# Patient Record
Sex: MALE | Race: WHITE | NOT HISPANIC OR LATINO | Employment: FULL TIME | URBAN - METROPOLITAN AREA
[De-identification: names, ages, dates, MRNs, and addresses within clinical notes are randomized per-mention and may not be internally consistent; named-entity substitution may affect disease eponyms.]

---

## 2023-08-10 LAB
LV EF: 67 %
LVEF MODALITY: NORMAL

## 2023-09-27 LAB
ALANINE AMINOTRANSFERASE (SGPT) (U/L) IN SER/PLAS: 27 U/L (ref 10–52)
ALBUMIN (G/DL) IN SER/PLAS: 4.2 G/DL (ref 3.4–5)
ALKALINE PHOSPHATASE (U/L) IN SER/PLAS: 76 U/L (ref 33–120)
ANION GAP IN SER/PLAS: 12 MMOL/L (ref 10–20)
ASPARTATE AMINOTRANSFERASE (SGOT) (U/L) IN SER/PLAS: 21 U/L (ref 9–39)
BILIRUBIN TOTAL (MG/DL) IN SER/PLAS: 0.5 MG/DL (ref 0–1.2)
CALCIUM (MG/DL) IN SER/PLAS: 9.6 MG/DL (ref 8.6–10.3)
CARBON DIOXIDE, TOTAL (MMOL/L) IN SER/PLAS: 29 MMOL/L (ref 21–32)
CHLORIDE (MMOL/L) IN SER/PLAS: 103 MMOL/L (ref 98–107)
CREATININE (MG/DL) IN SER/PLAS: 0.93 MG/DL (ref 0.5–1.3)
ERYTHROCYTE DISTRIBUTION WIDTH (RATIO) BY AUTOMATED COUNT: 13 % (ref 11.5–14.5)
ERYTHROCYTE MEAN CORPUSCULAR HEMOGLOBIN CONCENTRATION (G/DL) BY AUTOMATED: 32.9 G/DL (ref 32–36)
ERYTHROCYTE MEAN CORPUSCULAR VOLUME (FL) BY AUTOMATED COUNT: 92 FL (ref 80–100)
ERYTHROCYTE [DISTWIDTH] IN BLOOD BY AUTOMATED COUNT: 13 % (ref 11.5–14.5)
ERYTHROCYTES (10*6/UL) IN BLOOD BY AUTOMATED COUNT: 4.82 X10E12/L (ref 4.5–5.9)
GFR MALE: >90 ML/MIN/1.73M2
GLUCOSE (MG/DL) IN SER/PLAS: 47 MG/DL (ref 74–99)
HCT VFR BLD CALC: 44.4 % (ref 41–52)
HEMATOCRIT (%) IN BLOOD BY AUTOMATED COUNT: 44.4 % (ref 41–52)
HEMOGLOBIN (G/DL) IN BLOOD: 14.6 G/DL (ref 13.5–17.5)
HEMOGLOBIN: 14.6 G/DL (ref 13.5–17.5)
INR BLD: 1 (ref 0.9–1.1)
INR IN PPP BY COAGULATION ASSAY: 1 (ref 0.9–1.1)
LEUKOCYTES (10*3/UL) IN BLOOD BY AUTOMATED COUNT: 5.6 X10E9/L (ref 4.4–11.3)
MCHC RBC AUTO-ENTMCNC: 32.9 G/DL (ref 32–36)
MCV RBC AUTO: 92 FL (ref 80–100)
PLATELET # BLD: 247 X10E9/L (ref 150–450)
PLATELETS (10*3/UL) IN BLOOD AUTOMATED COUNT: 247 X10E9/L (ref 150–450)
POTASSIUM (MMOL/L) IN SER/PLAS: 4.4 MMOL/L (ref 3.5–5.3)
PROTEIN TOTAL: 7.1 G/DL (ref 6.4–8.2)
PROTHROMBIN TIME (PT) IN PPP BY COAGULATION ASSAY: 11.2 SEC (ref 9.8–12.8)
PROTHROMBIN TIME: 11.2 SEC (ref 9.8–12.8)
RBC # BLD: 4.82 X10E12/L (ref 4.5–5.9)
SODIUM (MMOL/L) IN SER/PLAS: 140 MMOL/L (ref 136–145)
THYROTROPIN (MIU/L) IN SER/PLAS BY DETECTION LIMIT <= 0.05 MIU/L: 1.51 MIU/L (ref 0.44–3.98)
THYROXINE (T4) FREE (NG/DL) IN SER/PLAS: 1.01 NG/DL (ref 0.61–1.12)
UREA NITROGEN (MG/DL) IN SER/PLAS: 15 MG/DL (ref 6–23)
WBC: 5.6 X10E9/L (ref 4.4–11.3)

## 2023-09-29 ENCOUNTER — HOSPITAL ENCOUNTER (OUTPATIENT)
Dept: DATA CONVERSION | Facility: HOSPITAL | Age: 53
End: 2023-09-29
Attending: INTERNAL MEDICINE | Admitting: INTERNAL MEDICINE
Payer: COMMERCIAL

## 2023-09-29 DIAGNOSIS — T82.7XXA INFECTION AND INFLAMMATORY REACTION DUE TO OTHER CARDIAC AND VASCULAR DEVICES, IMPLANTS AND GRAFTS, INITIAL ENCOUNTER (CMS-HCC): ICD-10-CM

## 2023-09-29 DIAGNOSIS — Z95.818 PRESENCE OF OTHER CARDIAC IMPLANTS AND GRAFTS: ICD-10-CM

## 2023-09-29 DIAGNOSIS — R55 SYNCOPE AND COLLAPSE: ICD-10-CM

## 2023-09-30 NOTE — H&P
History & Physical Reviewed:   I have reviewed the History and Physical dated:  27-Sep-2023   History and Physical reviewed and relevant findings noted. Patient examined to review pertinent physical  findings.: No significant changes   Home Medications Reviewed: no changes noted   Allergies Reviewed: no changes noted       Airway/Sedation Assessment:  ·  Mouth Opening OK yes   ·  Neck Flexibility OK yes   ·  Loose Teeth no   ·  Oropharyngeal Classification Class II       ERAS (Enhanced Recovery After Surgery):  ·  ERAS Patient: no     Consent:   COVID-19 Consent:  ·  COVID-19 Risk Consent Surgeon has reviewed key risks related to the risk of abbie COVID-19 and if they contract COVID-19 what the risks are.       Electronic Signatures:  Denzel Carpenter)  (Signed 29-Sep-2023 13:51)   Authored: History & Physical Reviewed, Airway/Sedation,  ERAS, Consent, Note Completion      Last Updated: 29-Sep-2023 13:51 by Denzel Carpenter)

## 2023-10-02 LAB
ATRIAL RATE: 60 BPM
P AXIS: 57 DEGREES
P OFFSET: 173 MS
P ONSET: 109 MS
PR INTERVAL: 206 MS
Q ONSET: 212 MS
QRS COUNT: 10 BEATS
QRS DURATION: 94 MS
QT INTERVAL: 390 MS
QTC CALCULATION(BAZETT): 390 MS
QTC FREDERICIA: 390 MS
R AXIS: 41 DEGREES
T AXIS: 48 DEGREES
T OFFSET: 407 MS
VENTRICULAR RATE: 60 BPM

## 2023-10-06 PROBLEM — E78.00 HYPERCHOLESTEROLEMIA: Status: ACTIVE | Noted: 2023-10-06

## 2023-10-06 PROBLEM — R30.0 DYSURIA: Status: ACTIVE | Noted: 2023-10-06

## 2023-10-06 PROBLEM — K21.9 GERD WITHOUT ESOPHAGITIS: Status: ACTIVE | Noted: 2023-10-06

## 2023-10-06 PROBLEM — B35.1 ONYCHOMYCOSIS OF TOENAIL: Status: ACTIVE | Noted: 2023-10-06

## 2023-10-06 PROBLEM — F12.91 HISTORY OF MARIJUANA USE: Status: ACTIVE | Noted: 2023-10-06

## 2023-10-06 PROBLEM — F41.1 GENERALIZED ANXIETY DISORDER: Status: ACTIVE | Noted: 2023-10-06

## 2023-10-06 PROBLEM — E03.9 HYPOTHYROIDISM: Status: ACTIVE | Noted: 2023-10-06

## 2023-10-06 PROBLEM — R94.31 ABNORMAL EKG: Status: ACTIVE | Noted: 2023-10-06

## 2023-10-06 PROBLEM — G47.00 INSOMNIA: Status: ACTIVE | Noted: 2023-10-06

## 2023-10-06 PROBLEM — B35.3 TINEA PEDIS OF BOTH FEET: Status: ACTIVE | Noted: 2023-10-06

## 2023-10-06 PROBLEM — R55 SYNCOPE: Status: ACTIVE | Noted: 2023-10-06

## 2023-10-06 PROBLEM — E66.3 OVERWEIGHT WITH BODY MASS INDEX (BMI) OF 27 TO 27.9 IN ADULT: Status: ACTIVE | Noted: 2023-10-06

## 2023-10-06 RX ORDER — NICOTINE 7MG/24HR
1 PATCH, TRANSDERMAL 24 HOURS TRANSDERMAL
COMMUNITY
Start: 2022-07-15 | End: 2023-10-11 | Stop reason: ALTCHOICE

## 2023-10-06 RX ORDER — ATORVASTATIN CALCIUM 10 MG/1
1 TABLET, FILM COATED ORAL DAILY
COMMUNITY
Start: 2018-09-05 | End: 2024-02-19 | Stop reason: SDUPTHER

## 2023-10-06 RX ORDER — LEVOTHYROXINE SODIUM 100 UG/1
1 TABLET ORAL DAILY
COMMUNITY
Start: 2019-10-18 | End: 2024-02-19 | Stop reason: SDUPTHER

## 2023-10-06 RX ORDER — FAMOTIDINE 40 MG/1
TABLET, FILM COATED ORAL
COMMUNITY
Start: 2019-12-03 | End: 2024-02-19 | Stop reason: SDUPTHER

## 2023-10-10 ENCOUNTER — TELEPHONE (OUTPATIENT)
Dept: CARDIOLOGY | Facility: CLINIC | Age: 53
End: 2023-10-10
Payer: COMMERCIAL

## 2023-10-10 NOTE — TELEPHONE ENCOUNTER
FMLA form received for patient.   Seen inpatient 8/5/23-8/7/23 for syncopal episode and received Loop Implant.   In follow up with Dr. Jayden MD- loop pocket was infected, and patient underwent Loop Removal on 9/29/23.   Forms completed to reflect above. Given to Joellen LANE RN to take to Dr. Jayden MD tomorrow when patient is here for follow up.

## 2023-10-11 ENCOUNTER — OFFICE VISIT (OUTPATIENT)
Dept: CARDIOLOGY | Facility: CLINIC | Age: 53
End: 2023-10-11
Payer: COMMERCIAL

## 2023-10-11 VITALS
WEIGHT: 192 LBS | HEART RATE: 64 BPM | HEIGHT: 70 IN | BODY MASS INDEX: 27.49 KG/M2 | SYSTOLIC BLOOD PRESSURE: 104 MMHG | DIASTOLIC BLOOD PRESSURE: 67 MMHG

## 2023-10-11 DIAGNOSIS — R94.31 ABNORMAL EKG: ICD-10-CM

## 2023-10-11 DIAGNOSIS — R55 SYNCOPE, UNSPECIFIED SYNCOPE TYPE: Primary | ICD-10-CM

## 2023-10-11 PROCEDURE — 93000 ELECTROCARDIOGRAM COMPLETE: CPT | Performed by: INTERNAL MEDICINE

## 2023-10-11 PROCEDURE — 99214 OFFICE O/P EST MOD 30 MIN: CPT | Performed by: INTERNAL MEDICINE

## 2023-10-11 NOTE — PATIENT INSTRUCTIONS
Patient to follow up in 2 months  Patient may go back to work from a cardiac standpoint with no restrictions

## 2023-10-11 NOTE — TELEPHONE ENCOUNTER
Patient was seen in the office today with DR. Denzel Carpenter M.D.  Cleared to go back to work with no restrictions from a cardiac standpoint.  DR. Denzel Carpenter M.D. signed [facility] paperwork and we will fax to [employer].

## 2023-10-11 NOTE — PROGRESS NOTES
CARDIOLOGY OFFICE VISIT      CHIEF COMPLAINT  Chief Complaint   Patient presents with    Hospital Follow-up     Patient presented today for hospital follow up.       HISTORY OF PRESENT ILLNESS  HPI    53-year-old male with a past medical history of hypothyroidism and hyperlipidemia. History of smoking and marijuana use. Patient was admitted and was 2023 after having a syncopal episode. Apparently his first episode of syncope was 4 years ago. He was diagnosed with possible seizures.    In August 2023, patient woke up in the morning he was brushing his teeth and suddenly his fiancée heard a loud thump and went on to check on him and the patient was laying on the ground. she is started CPR for approximately 1 to 2 minutes and the patient regained consciousness. In the emergency department, initial EKG was sinus bradycardia. Telemetry shows sinus rhythm. TSH 0.63. Echocardiogram in August 2023 shows left ventricular shunt fraction of 60% with trace mitral and tricuspid regurgitation. His stress test in August 2023 was negative for ischemia myocardial infarction with a left ventricular ejection fraction of 67%. A loop recorder was implanted.    During the last office visit, there was evidence of infection of the loop recorder pocket.    Patient underwent loop recorder explantation September 29, 2023 with no complications.  He received antibiotic therapy for 7 days.    Overall he is doing well.  Denies any fever or chills.  No dizziness or syncope.    EKG performed today shows sinus rhythm rate of 64 bpm QRS duration 92 ms QT corrected 400 ms.  Rhythm strip shows the same pattern.        Past Medical History  Past Medical History:   Diagnosis Date    Encounter for general adult medical examination without abnormal findings     Health care maintenance    Other specified abnormal findings of blood chemistry     High serum cholestanol    Personal history of other diseases of the digestive system 12/03/2019    History of  gastroesophageal reflux (GERD)    Personal history of other diseases of the respiratory system     History of pharyngitis    Personal history of other specified conditions     History of abdominal pain    Personal history of other specified conditions 10/18/2016    History of diarrhea    Personal history of other specified conditions     History of dysphagia    Personal history of other specified conditions     History of syncope       Social History  Social History     Tobacco Use    Smoking status: Never    Smokeless tobacco: Current   Substance Use Topics    Alcohol use: Not Currently    Drug use: Yes     Types: Marijuana       Family History     Family History   Problem Relation Name Age of Onset    Hypertension Mother      Coronary artery disease Father      Alzheimer's disease Father      Dementia Father      Diabetes Other grandparent         Allergies:  No Known Allergies     Outpatient Medications:  Current Outpatient Medications   Medication Instructions    atorvastatin (Lipitor) 10 mg tablet 1 tablet, oral, Daily    famotidine (Pepcid) 40 mg tablet 1 tab(s) orally once a day (at bedtime)    levothyroxine (Synthroid, Levoxyl) 100 mcg tablet 1 tablet, oral, Daily          REVIEW OF SYSTEMS  ROS      VITALS  Vitals:    10/11/23 0818   BP: 104/67   Pulse: 64       PHYSICAL EXAM  Physical Exam  PECOMP    PHYSICAL EXAMINATION:  GENERAL APPEARANCE: Well developed, well nourished, in no acute distress.  CHEST: Symmetric and non-tender.  INTEGUMENT: Skin warm and dry, without gross excoriationis or lesions.  HEENT: No gross abnormalities of conjunctiva, teeth, gums, oral mucosa  NECK: Supple, no JVD, no bruit. Thyroid not palpable. Carotid upstrokes normal.  NEURO/PSHCY: Alert and oriented x3; appropriate behavior and responses and responses, grossly normal cerebellar function with normal balance and coordination  LUNGS: Clear to auscultation bilaterally; normal respiratory effort.  HEART: Rate and rhythm regular  with no evident murmur; no gallop appreciated. There are no rubs, clicks or heaves. PMI nondisplaced.  ABDOMEN: Soft, nontender, no palpable hepatosplenomegaly, no mases, no bruits. Abdominal aorta not noted to be enlarged.  MUSCULOSKELETAL: Ambulatory with normal tandem gait.  EXTREMITIES: Warm with good color, no clubbing or cyanois. There is no edema noted.  PERIPHERAL VASCULAR: Pulses present and equally palpable; 2+ throughout. No femoral bruits.      ASSESSMENT AND PLAN    Clinical impression    1. Syncope, recurrence  2. Status post loop recorder implanted in August 2023 with no complications  3. Normal left ventricular function per echocardiogram in 2023  4. No evidence of ischemia per stress test in 2023  5. History of hyperlipidemia  6. Loop recorder pocket infection    Plan-recommendations    I had a lengthy discussion with patient regarding management of syncope and loop recorder implantation with removal due to infection.  Incision with the device pocket healing well.  Stitches removed during this office visit.    Long conversation with patient regarding plan to follow.  Patient would like to be in observation for now.  He believes that his syncopal episode was due to dehydration.  He understood that in case there is any evidence of atrial or ventricular arrhythmias we may not be able to capture those if there is no monitor available at that time.  Patient agrees with that.    We will continue with observation for now.    If patient has recurrence of syncope, he agrees that a loop recorder will be recommended.    Follow my office in 2 months or sooner if needed.    Risk factor modification and lifestyle modification discussed with patient. Diet , exercise and hydration discussed with patient.    I have personally review with patient during this office visit, laboratory data, echocardiogram results, stress test results, Holter-event monitor results prior and after the last electrophysiology visit. All  questions has been answered.    Please excuse any errors in grammar or translation related to this dictation.  Voice recognition software was utilized to prepare this document.

## 2023-10-12 ENCOUNTER — OFFICE VISIT (OUTPATIENT)
Dept: PRIMARY CARE | Facility: CLINIC | Age: 53
End: 2023-10-12
Payer: COMMERCIAL

## 2023-10-12 VITALS
WEIGHT: 192 LBS | DIASTOLIC BLOOD PRESSURE: 80 MMHG | TEMPERATURE: 98 F | SYSTOLIC BLOOD PRESSURE: 122 MMHG | HEIGHT: 70 IN | BODY MASS INDEX: 27.49 KG/M2 | HEART RATE: 72 BPM

## 2023-10-12 DIAGNOSIS — R55 SYNCOPE, UNSPECIFIED SYNCOPE TYPE: Primary | ICD-10-CM

## 2023-10-12 PROCEDURE — 1036F TOBACCO NON-USER: CPT | Performed by: STUDENT IN AN ORGANIZED HEALTH CARE EDUCATION/TRAINING PROGRAM

## 2023-10-12 PROCEDURE — 99213 OFFICE O/P EST LOW 20 MIN: CPT | Performed by: STUDENT IN AN ORGANIZED HEALTH CARE EDUCATION/TRAINING PROGRAM

## 2023-10-12 ASSESSMENT — ENCOUNTER SYMPTOMS
LOSS OF SENSATION IN FEET: 0
DEPRESSION: 0
OCCASIONAL FEELINGS OF UNSTEADINESS: 0

## 2023-10-12 ASSESSMENT — PATIENT HEALTH QUESTIONNAIRE - PHQ9
2. FEELING DOWN, DEPRESSED OR HOPELESS: NOT AT ALL
1. LITTLE INTEREST OR PLEASURE IN DOING THINGS: NOT AT ALL
SUM OF ALL RESPONSES TO PHQ9 QUESTIONS 1 AND 2: 0

## 2023-10-12 NOTE — PROGRESS NOTES
"Subjective   Patient ID: Erick Jean is a 53 y.o. male who presents for Return to Work (Patient is here in office today to get clearance to return to work. Patient states that neuro  (dr. Alvarez) and EP (Dr. Carpenter) gave clearance ).    Saw Dr. Alvarez just before this appointment, and unfortunately did not ask for work clearance  He is going to follow-up in a month  Neuro was concerned for thenar wasting  Patient does not have symptoms of carpal tunnel  Also saw Dr. Carpenter yesterday and he states he is OK with going back to work from a cardiac standpoint  Feels well, no pre-syncope, and no syncopal events since August  Cardiology feels it is possible, as the patient states, that it may have been due to dehydration and is going to watch this carefully as he returns to work    Plan:  Work note to return to work without restrictions  Follow-up with cardiology in 2 months  Follow-up with neurology in 1 month  Carefully monitor for any symptoms of pre-syncope and immediately sit down and rehydrate if any of these occur, and if they persist, call 911  Follow-up in 4 months    HPI     Review of Systems    Objective   /80 (BP Location: Left arm, Patient Position: Sitting, BP Cuff Size: Adult)   Pulse 72   Temp 36.7 °C (98 °F) (Temporal)   Ht 1.778 m (5' 10\")   Wt 87.1 kg (192 lb)   BMI 27.55 kg/m²     Physical Exam  Constitutional:       General: He is not in acute distress.     Appearance: Normal appearance. He is not ill-appearing or toxic-appearing.   HENT:      Head: Atraumatic.      Mouth/Throat:      Mouth: Mucous membranes are moist.      Pharynx: Oropharynx is clear.   Eyes:      Extraocular Movements: Extraocular movements intact.      Conjunctiva/sclera: Conjunctivae normal.      Pupils: Pupils are equal, round, and reactive to light.   Cardiovascular:      Rate and Rhythm: Normal rate and regular rhythm.      Pulses: Normal pulses.      Heart sounds: Normal heart sounds. No murmur heard.  Pulmonary:    "   Effort: Pulmonary effort is normal. No respiratory distress.      Breath sounds: Normal breath sounds.   Abdominal:      General: Abdomen is flat.      Palpations: Abdomen is soft.      Tenderness: There is no abdominal tenderness.   Musculoskeletal:      Right lower leg: No edema.      Left lower leg: No edema.      Comments: Negative tinels and phalens bilaterally  Full strength and sensory througout  No reproducible symptoms of CTS   Skin:     General: Skin is warm and dry.      Capillary Refill: Capillary refill takes less than 2 seconds.      Findings: No rash.   Neurological:      General: No focal deficit present.      Mental Status: He is alert.      Cranial Nerves: No cranial nerve deficit.      Gait: Gait normal.   Psychiatric:         Mood and Affect: Mood normal.         Behavior: Behavior normal.         Assessment/Plan   Problem List Items Addressed This Visit             ICD-10-CM    Syncope - Primary R55

## 2023-10-12 NOTE — LETTER
October 12, 2023     Patient: Erick Jean   YOB: 1970   Date of Visit: 10/12/2023       To Whom It May Concern:    It is my medical opinion that Erick Jean may return to full duty immediately with no restrictions.  Erick has been evaluated by myself, cardiology, and neurology, and it is thought at this time that there is no significant risk for Erick to return to work.      If you have any questions or concerns, please don't hesitate to call.         Sincerely,        Alin Daly DO    CC: No Recipients

## 2023-10-13 ENCOUNTER — TELEPHONE (OUTPATIENT)
Dept: CARDIOLOGY | Facility: CLINIC | Age: 53
End: 2023-10-13
Payer: COMMERCIAL

## 2023-10-13 NOTE — TELEPHONE ENCOUNTER
Patient called for a letter to return to work. Per DR. Denzel Carpenter M.D. he can return with no restrictions. This was sent to his company per his request.

## 2023-10-13 NOTE — LETTER
October 13, 2023     Erick Jean  [address] [place] [address]    Patient: Erick Jean   YOB: 1970   Date of Visit: 10/13/2023       To Whom It May Concern:      Erick Jean was seen in my office on Wednesday, October 11, 2023.  He may return to work with no restrictions on October 12, 2023.     Sincerely,     Dr. Denzel Carpenter  ______________________________________________________________________________________

## 2023-10-16 ENCOUNTER — TELEPHONE (OUTPATIENT)
Dept: CARDIOLOGY | Facility: CLINIC | Age: 53
End: 2023-10-16
Payer: COMMERCIAL

## 2023-10-16 NOTE — LETTER
Date: 2023  RE:  Erick Jean  :  1970      To Whom It May Concern:    Our patient, Erick, has been under our care and now may return back to work without restrictions.    Their return to work date is:  10/20/2023    If you have questions concerning this patient's immediate care, please feel free to contact our office at [phone].    Sincerely,      Dr Denzel Carpenter

## 2023-10-16 NOTE — TELEPHONE ENCOUNTER
Pt left message asking for return call.    Pt's employee health nurse also left message stating that they are still in need of RTW note for sonja Vera can be reached at [phone].    Please follow up with pt and Chuy.

## 2023-10-20 NOTE — TELEPHONE ENCOUNTER
Telephone message received from the patient's employer requesting a return to work slip.  Medical records were reviewed and confirmed with Dr. Carpenter the patient has no work restrictions.  He may return to work as scheduled.  Note tasked to JENNY Swain for patient follow-up.

## 2023-10-20 NOTE — TELEPHONE ENCOUNTER
Per message from Dr Carpentre, patient may return to work with no restrictions. Called Chuy and left a detailed message requesting fax number so that note can be faxed to her.

## 2023-12-20 ENCOUNTER — OFFICE VISIT (OUTPATIENT)
Dept: CARDIOLOGY | Facility: CLINIC | Age: 53
End: 2023-12-20
Payer: COMMERCIAL

## 2023-12-20 VITALS
HEIGHT: 70 IN | DIASTOLIC BLOOD PRESSURE: 70 MMHG | WEIGHT: 195 LBS | SYSTOLIC BLOOD PRESSURE: 102 MMHG | HEART RATE: 65 BPM | BODY MASS INDEX: 27.92 KG/M2

## 2023-12-20 DIAGNOSIS — Z78.9 NEVER SMOKED CIGARETTES: ICD-10-CM

## 2023-12-20 DIAGNOSIS — E66.3 OVERWEIGHT WITH BODY MASS INDEX (BMI) OF 27 TO 27.9 IN ADULT: ICD-10-CM

## 2023-12-20 DIAGNOSIS — R55 SYNCOPE, UNSPECIFIED SYNCOPE TYPE: Primary | ICD-10-CM

## 2023-12-20 PROCEDURE — 3008F BODY MASS INDEX DOCD: CPT | Performed by: INTERNAL MEDICINE

## 2023-12-20 PROCEDURE — 93000 ELECTROCARDIOGRAM COMPLETE: CPT | Performed by: INTERNAL MEDICINE

## 2023-12-20 PROCEDURE — 99214 OFFICE O/P EST MOD 30 MIN: CPT | Performed by: INTERNAL MEDICINE

## 2023-12-20 PROCEDURE — 1036F TOBACCO NON-USER: CPT | Performed by: INTERNAL MEDICINE

## 2023-12-20 NOTE — PROGRESS NOTES
CARDIOLOGY OFFICE VISIT      CHIEF COMPLAINT  Chief Complaint   Patient presents with    Follow-up     Patient presented today for a 2 month follow up.         HISTORY OF PRESENT ILLNESS  HPI    53-year-old male with a past medical history of hypothyroidism and hyperlipidemia. History of smoking and marijuana use. Patient was admitted and was 2023 after having a syncopal episode. Apparently his first episode of syncope was 4 years ago. He was diagnosed with possible seizures.     In August 2023, patient woke up in the morning he was brushing his teeth and suddenly his fiancée heard a loud thump and went on to check on him and the patient was laying on the ground. she is started CPR for approximately 1 to 2 minutes and the patient regained consciousness. In the emergency department, initial EKG was sinus bradycardia. Telemetry shows sinus rhythm. TSH 0.63. Echocardiogram in August 2023 shows left ventricular shunt fraction of 60% with trace mitral and tricuspid regurgitation. His stress test in August 2023 was negative for ischemia myocardial infarction with a left ventricular ejection fraction of 67%. A loop recorder was implanted.     there was evidence of infection of the loop recorder pocket.     Patient underwent loop recorder explantation September 29, 2023 with no complications. He received antibiotic therapy for 7 days.    Since the last office visit, he has been doing well.  He denies any symptoms of chest pain or shortness of breath or palpitations.    EKG performed today shows sinus rhythm at rate of 65 bpm QRS ration 90 ms QT corrected 407 ms.  Rhythm strip shows the same pattern.        Past Medical History  Past Medical History:   Diagnosis Date    Encounter for general adult medical examination without abnormal findings     Health care maintenance    Other specified abnormal findings of blood chemistry     High serum cholestanol    Personal history of other diseases of the digestive system 12/03/2019     History of gastroesophageal reflux (GERD)    Personal history of other diseases of the respiratory system     History of pharyngitis    Personal history of other specified conditions     History of abdominal pain    Personal history of other specified conditions 10/18/2016    History of diarrhea    Personal history of other specified conditions     History of dysphagia    Personal history of other specified conditions     History of syncope       Social History  Social History     Tobacco Use    Smoking status: Never    Smokeless tobacco: Never   Vaping Use    Vaping Use: Every day    Substances: Nicotine    Devices: Disposable   Substance Use Topics    Alcohol use: Yes     Comment: occasionally    Drug use: Not Currently       Family History     Family History   Problem Relation Name Age of Onset    Hypertension Mother      Coronary artery disease Father      Alzheimer's disease Father      Dementia Father      Diabetes Other grandparent         Allergies:  No Known Allergies     Outpatient Medications:  Current Outpatient Medications   Medication Instructions    atorvastatin (Lipitor) 10 mg tablet 1 tablet, oral, Daily    famotidine (Pepcid) 40 mg tablet 1 tab(s) orally once a day (at bedtime)    levothyroxine (Synthroid, Levoxyl) 100 mcg tablet 1 tablet, oral, Daily          REVIEW OF SYSTEMS  Review of Systems   All other systems reviewed and are negative.        VITALS  Vitals:    12/20/23 0754   BP: 102/70   Pulse: 65       PHYSICAL EXAM  Constitutional:       General: Awake.      Appearance: Normal and healthy appearance. Well-developed and not in distress.   Neck:      Vascular: No JVR. JVD normal.   Pulmonary:      Effort: Pulmonary effort is normal.      Breath sounds: Normal breath sounds. No wheezing. No rhonchi. No rales.   Chest:      Chest wall: Not tender to palpatation.   Cardiovascular:      PMI at left midclavicular line. Normal rate. Regular rhythm. Normal S1. Normal S2.       Murmurs: There  is no murmur.      No gallop.  No click. No rub.   Pulses:     Intact distal pulses.   Edema:     Peripheral edema absent.   Abdominal:      Tenderness: There is no abdominal tenderness.   Musculoskeletal: Normal range of motion.         General: No tenderness. Skin:     General: Skin is warm and dry.   Neurological:      General: No focal deficit present.      Mental Status: Alert and oriented to person, place and time.           ASSESSMENT AND PLAN  Clinical impression     1. Syncope, recurrence  2. Status post loop recorder implanted in August 2023 with no complications  3. Normal left ventricular function per echocardiogram in 2023  4. No evidence of ischemia per stress test in 2023  5. History of hyperlipidemia  6. Loop recorder pocket infection, status post loop removal in September 2023.  No reimplantation so far    Plan recommendations    Patient is doing well from the electrophysiologist on point.  No recurrence of syncope.  He will be followed my office every 6 months or sooner needed.    If he has recurrence of syncope, we will order a Holter or event monitor or implanted a new loop recorder.    Risk factor modification and lifestyle modification discussed with patient. Diet , exercise and hydration discussed with patient.    I have personally review with patient during this office visit, laboratory data, echocardiogram results, stress test results, Holter-event monitor results prior and after the last electrophysiology visit. All questions has been answered.    Please excuse any errors in grammar or translation related to this dictation.  Voice recognition software was utilized to prepare this document.

## 2023-12-20 NOTE — PATIENT INSTRUCTIONS
Continue same medications/treatment.  Patient educated on proper medication use.  Patient educated on risk factor modification.  Please bring any lab results from other providers/physicians to your next appointment.    Please bring all medicines, vitamins, and herbal supplements with you when you come to the office.    Prescriptions will not be filled unless you are compliant with your follow up appointments or have a follow up appointment scheduled as per instruction of your physician. Refills should be requested at the time of your visit.    Follow up with Alejandra in 6 months     MADELEINE ASHBY RN, AM SCRIBING FOR, AND IN THE PRESENCE OF DR. EVELIA HERBERT MD

## 2024-02-12 ENCOUNTER — OFFICE VISIT (OUTPATIENT)
Dept: PRIMARY CARE | Facility: CLINIC | Age: 54
End: 2024-02-12
Payer: COMMERCIAL

## 2024-02-12 VITALS
SYSTOLIC BLOOD PRESSURE: 116 MMHG | DIASTOLIC BLOOD PRESSURE: 82 MMHG | TEMPERATURE: 98.3 F | HEART RATE: 76 BPM | BODY MASS INDEX: 28.2 KG/M2 | HEIGHT: 70 IN | WEIGHT: 197 LBS

## 2024-02-12 DIAGNOSIS — R55 SYNCOPE, UNSPECIFIED SYNCOPE TYPE: Primary | ICD-10-CM

## 2024-02-12 PROCEDURE — 99213 OFFICE O/P EST LOW 20 MIN: CPT | Performed by: STUDENT IN AN ORGANIZED HEALTH CARE EDUCATION/TRAINING PROGRAM

## 2024-02-12 PROCEDURE — 1036F TOBACCO NON-USER: CPT | Performed by: STUDENT IN AN ORGANIZED HEALTH CARE EDUCATION/TRAINING PROGRAM

## 2024-02-12 PROCEDURE — 3008F BODY MASS INDEX DOCD: CPT | Performed by: STUDENT IN AN ORGANIZED HEALTH CARE EDUCATION/TRAINING PROGRAM

## 2024-02-12 ASSESSMENT — PATIENT HEALTH QUESTIONNAIRE - PHQ9
1. LITTLE INTEREST OR PLEASURE IN DOING THINGS: NOT AT ALL
2. FEELING DOWN, DEPRESSED OR HOPELESS: NOT AT ALL
SUM OF ALL RESPONSES TO PHQ9 QUESTIONS 1 AND 2: 0

## 2024-02-12 NOTE — PROGRESS NOTES
"Subjective   Patient ID: Erick Jean is a 53 y.o. male who presents for 2 month follow up (Patient is here in office today for 2 month follow. Pt has not had an reoccurrence of syncope, he did follow up with EP in 12/2023 who advised to follow up in 6 months,but sooner if he has any issues.).    Follow up on syncope  Has been doing well  Reviewed labs and specialist notes    Plan  Follow up with cardiology in 6 months  Return to office with any symptoms or concerns  Go to ED if pass out again or severe symptoms  Follow up as needed    10/12/23- Patient ID: Erick Jaen is a 53 y.o. male who presents for Return to Work (Patient is here in office today to get clearance to return to work. Patient states that neuro  (dr. Alvarez) and EP (Dr. Carpenter) gave clearance ).     Saw Dr. Alvarez just before this appointment, and unfortunately did not ask for work clearance  He is going to follow-up in a month  Neuro was concerned for thenar wasting  Patient does not have symptoms of carpal tunnel  Also saw Dr. Carpenter yesterday and he states he is OK with going back to work from a cardiac standpoint  Feels well, no pre-syncope, and no syncopal events since August  Cardiology feels it is possible, as the patient states, that it may have been due to dehydration and is going to watch this carefully as he returns to work     Plan:  Work note to return to work without restrictions  Follow-up with cardiology in 2 months  Follow-up with neurology in 1 month  Carefully monitor for any symptoms of pre-syncope and immediately sit down and rehydrate if any of these occur, and if they persist, call 911  Follow-up in 4 months    HPI     Review of Systems    Objective   /82 (BP Location: Left arm, Patient Position: Sitting, BP Cuff Size: Adult)   Pulse 76   Temp 36.8 °C (98.3 °F) (Temporal)   Ht 1.778 m (5' 10\")   Wt 89.4 kg (197 lb)   BMI 28.27 kg/m²     Physical Exam    Assessment/Plan   Problem List Items Addressed This Visit  "            ICD-10-CM    Syncope - Primary R55

## 2024-02-19 DIAGNOSIS — K21.9 GERD WITHOUT ESOPHAGITIS: ICD-10-CM

## 2024-02-19 DIAGNOSIS — E78.00 HYPERCHOLESTEROLEMIA: Primary | ICD-10-CM

## 2024-02-19 DIAGNOSIS — E03.9 HYPOTHYROIDISM, UNSPECIFIED TYPE: ICD-10-CM

## 2024-02-21 RX ORDER — LEVOTHYROXINE SODIUM 100 UG/1
100 TABLET ORAL DAILY
Qty: 90 TABLET | Refills: 1 | Status: SHIPPED | OUTPATIENT
Start: 2024-02-21

## 2024-02-21 RX ORDER — ATORVASTATIN CALCIUM 10 MG/1
10 TABLET, FILM COATED ORAL DAILY
Qty: 90 TABLET | Refills: 1 | Status: SHIPPED | OUTPATIENT
Start: 2024-02-21

## 2024-02-21 RX ORDER — FAMOTIDINE 40 MG/1
TABLET, FILM COATED ORAL
Qty: 90 TABLET | Refills: 1 | Status: SHIPPED | OUTPATIENT
Start: 2024-02-21

## 2024-06-20 ENCOUNTER — APPOINTMENT (OUTPATIENT)
Dept: CARDIOLOGY | Facility: CLINIC | Age: 54
End: 2024-06-20
Payer: COMMERCIAL

## 2024-07-09 ENCOUNTER — APPOINTMENT (OUTPATIENT)
Dept: PRIMARY CARE | Facility: CLINIC | Age: 54
End: 2024-07-09
Payer: COMMERCIAL

## 2024-07-09 VITALS
BODY MASS INDEX: 27.26 KG/M2 | TEMPERATURE: 98.3 F | HEART RATE: 68 BPM | HEIGHT: 70 IN | OXYGEN SATURATION: 97 % | WEIGHT: 190.4 LBS | DIASTOLIC BLOOD PRESSURE: 74 MMHG | SYSTOLIC BLOOD PRESSURE: 108 MMHG

## 2024-07-09 DIAGNOSIS — E58 INADEQUATE INTAKE OF CALCIUM AND VITAMIN D: ICD-10-CM

## 2024-07-09 DIAGNOSIS — Z12.5 SCREENING FOR PROSTATE CANCER: ICD-10-CM

## 2024-07-09 DIAGNOSIS — R55 SYNCOPE, UNSPECIFIED SYNCOPE TYPE: ICD-10-CM

## 2024-07-09 DIAGNOSIS — E03.9 ACQUIRED HYPOTHYROIDISM: Primary | ICD-10-CM

## 2024-07-09 DIAGNOSIS — Z13.89 SCREENING FOR BLOOD OR PROTEIN IN URINE: ICD-10-CM

## 2024-07-09 DIAGNOSIS — E78.00 HYPERCHOLESTEROLEMIA: ICD-10-CM

## 2024-07-09 DIAGNOSIS — E55.9 INADEQUATE INTAKE OF CALCIUM AND VITAMIN D: ICD-10-CM

## 2024-07-09 PROBLEM — G47.00 INSOMNIA: Status: RESOLVED | Noted: 2023-10-06 | Resolved: 2024-07-09

## 2024-07-09 PROBLEM — R30.0 DYSURIA: Status: RESOLVED | Noted: 2023-10-06 | Resolved: 2024-07-09

## 2024-07-09 PROBLEM — R94.31 ABNORMAL EKG: Status: RESOLVED | Noted: 2023-10-06 | Resolved: 2024-07-09

## 2024-07-09 PROBLEM — B35.3 TINEA PEDIS OF BOTH FEET: Status: RESOLVED | Noted: 2023-10-06 | Resolved: 2024-07-09

## 2024-07-09 PROBLEM — F41.1 GENERALIZED ANXIETY DISORDER: Status: RESOLVED | Noted: 2023-10-06 | Resolved: 2024-07-09

## 2024-07-09 PROCEDURE — 3008F BODY MASS INDEX DOCD: CPT | Performed by: INTERNAL MEDICINE

## 2024-07-09 PROCEDURE — 99214 OFFICE O/P EST MOD 30 MIN: CPT | Performed by: INTERNAL MEDICINE

## 2024-07-09 ASSESSMENT — ENCOUNTER SYMPTOMS: DEPRESSION: 0

## 2024-07-09 ASSESSMENT — PATIENT HEALTH QUESTIONNAIRE - PHQ9
2. FEELING DOWN, DEPRESSED OR HOPELESS: NOT AT ALL
SUM OF ALL RESPONSES TO PHQ9 QUESTIONS 1 AND 2: 0
1. LITTLE INTEREST OR PLEASURE IN DOING THINGS: NOT AT ALL

## 2024-07-15 DIAGNOSIS — E03.9 HYPOTHYROIDISM, UNSPECIFIED TYPE: ICD-10-CM

## 2024-07-15 DIAGNOSIS — K21.9 GERD WITHOUT ESOPHAGITIS: ICD-10-CM

## 2024-07-15 DIAGNOSIS — E78.00 HYPERCHOLESTEROLEMIA: ICD-10-CM

## 2024-07-17 RX ORDER — ATORVASTATIN CALCIUM 10 MG/1
10 TABLET, FILM COATED ORAL DAILY
Qty: 90 TABLET | Refills: 1 | Status: SHIPPED | OUTPATIENT
Start: 2024-07-17

## 2024-07-17 RX ORDER — LEVOTHYROXINE SODIUM 100 UG/1
100 TABLET ORAL DAILY
Qty: 90 TABLET | Refills: 1 | Status: SHIPPED | OUTPATIENT
Start: 2024-07-17

## 2024-07-17 RX ORDER — FAMOTIDINE 40 MG/1
TABLET, FILM COATED ORAL
Qty: 90 TABLET | Refills: 1 | Status: SHIPPED | OUTPATIENT
Start: 2024-07-17

## 2024-07-22 ENCOUNTER — LAB (OUTPATIENT)
Dept: LAB | Facility: LAB | Age: 54
End: 2024-07-22
Payer: COMMERCIAL

## 2024-07-22 ENCOUNTER — APPOINTMENT (OUTPATIENT)
Dept: CARDIOLOGY | Facility: CLINIC | Age: 54
End: 2024-07-22
Payer: COMMERCIAL

## 2024-07-22 VITALS
SYSTOLIC BLOOD PRESSURE: 120 MMHG | DIASTOLIC BLOOD PRESSURE: 62 MMHG | HEART RATE: 71 BPM | HEIGHT: 70 IN | WEIGHT: 192 LBS | BODY MASS INDEX: 27.49 KG/M2

## 2024-07-22 DIAGNOSIS — E55.9 INADEQUATE INTAKE OF CALCIUM AND VITAMIN D: ICD-10-CM

## 2024-07-22 DIAGNOSIS — Z13.89 SCREENING FOR BLOOD OR PROTEIN IN URINE: ICD-10-CM

## 2024-07-22 DIAGNOSIS — E58 INADEQUATE INTAKE OF CALCIUM AND VITAMIN D: ICD-10-CM

## 2024-07-22 DIAGNOSIS — E55.9 VITAMIN D INSUFFICIENCY: Primary | ICD-10-CM

## 2024-07-22 DIAGNOSIS — Z12.5 SCREENING FOR PROSTATE CANCER: ICD-10-CM

## 2024-07-22 DIAGNOSIS — Z13.29 SCREENING FOR THYROID DISORDER: ICD-10-CM

## 2024-07-22 DIAGNOSIS — Z13.220 SCREENING FOR LIPID DISORDERS: ICD-10-CM

## 2024-07-22 DIAGNOSIS — Z00.00 ROUTINE GENERAL MEDICAL EXAMINATION AT HEALTH CARE FACILITY: ICD-10-CM

## 2024-07-22 DIAGNOSIS — R55 SYNCOPE, UNSPECIFIED SYNCOPE TYPE: ICD-10-CM

## 2024-07-22 LAB
25(OH)D3 SERPL-MCNC: 24 NG/ML (ref 30–100)
ALBUMIN SERPL BCP-MCNC: 4.2 G/DL (ref 3.4–5)
ALP SERPL-CCNC: 72 U/L (ref 33–120)
ALT SERPL W P-5'-P-CCNC: 19 U/L (ref 10–52)
ANION GAP SERPL CALC-SCNC: 10 MMOL/L (ref 10–20)
APPEARANCE UR: CLEAR
AST SERPL W P-5'-P-CCNC: 18 U/L (ref 9–39)
BASOPHILS # BLD AUTO: 0.03 X10*3/UL (ref 0–0.1)
BASOPHILS NFR BLD AUTO: 0.6 %
BILIRUB SERPL-MCNC: 0.6 MG/DL (ref 0–1.2)
BILIRUB UR STRIP.AUTO-MCNC: NEGATIVE MG/DL
BUN SERPL-MCNC: 13 MG/DL (ref 6–23)
CALCIUM SERPL-MCNC: 8.9 MG/DL (ref 8.6–10.3)
CHLORIDE SERPL-SCNC: 107 MMOL/L (ref 98–107)
CHOLEST SERPL-MCNC: 138 MG/DL (ref 0–199)
CHOLESTEROL/HDL RATIO: 3.5
CO2 SERPL-SCNC: 27 MMOL/L (ref 21–32)
COLOR UR: NORMAL
CREAT SERPL-MCNC: 1.02 MG/DL (ref 0.5–1.3)
EGFRCR SERPLBLD CKD-EPI 2021: 87 ML/MIN/1.73M*2
EOSINOPHIL # BLD AUTO: 0.1 X10*3/UL (ref 0–0.7)
EOSINOPHIL NFR BLD AUTO: 1.9 %
ERYTHROCYTE [DISTWIDTH] IN BLOOD BY AUTOMATED COUNT: 12.9 % (ref 11.5–14.5)
GLUCOSE SERPL-MCNC: 92 MG/DL (ref 74–99)
GLUCOSE UR STRIP.AUTO-MCNC: NORMAL MG/DL
HCT VFR BLD AUTO: 42.9 % (ref 41–52)
HDLC SERPL-MCNC: 40 MG/DL
HGB BLD-MCNC: 14.3 G/DL (ref 13.5–17.5)
IMM GRANULOCYTES # BLD AUTO: 0.01 X10*3/UL (ref 0–0.7)
IMM GRANULOCYTES NFR BLD AUTO: 0.2 % (ref 0–0.9)
KETONES UR STRIP.AUTO-MCNC: NEGATIVE MG/DL
LDLC SERPL CALC-MCNC: 78 MG/DL
LEUKOCYTE ESTERASE UR QL STRIP.AUTO: NEGATIVE
LYMPHOCYTES # BLD AUTO: 1.22 X10*3/UL (ref 1.2–4.8)
LYMPHOCYTES NFR BLD AUTO: 23.5 %
MCH RBC QN AUTO: 30.1 PG (ref 26–34)
MCHC RBC AUTO-ENTMCNC: 33.3 G/DL (ref 32–36)
MCV RBC AUTO: 90 FL (ref 80–100)
MONOCYTES # BLD AUTO: 0.57 X10*3/UL (ref 0.1–1)
MONOCYTES NFR BLD AUTO: 11 %
NEUTROPHILS # BLD AUTO: 3.26 X10*3/UL (ref 1.2–7.7)
NEUTROPHILS NFR BLD AUTO: 62.8 %
NITRITE UR QL STRIP.AUTO: NEGATIVE
NON HDL CHOLESTEROL: 98 MG/DL (ref 0–149)
NRBC BLD-RTO: 0 /100 WBCS (ref 0–0)
PH UR STRIP.AUTO: 7 [PH]
PLATELET # BLD AUTO: 232 X10*3/UL (ref 150–450)
POTASSIUM SERPL-SCNC: 4.2 MMOL/L (ref 3.5–5.3)
PROT SERPL-MCNC: 6.5 G/DL (ref 6.4–8.2)
PROT UR STRIP.AUTO-MCNC: NEGATIVE MG/DL
PSA SERPL-MCNC: 0.74 NG/ML
RBC # BLD AUTO: 4.75 X10*6/UL (ref 4.5–5.9)
RBC # UR STRIP.AUTO: NEGATIVE /UL
SODIUM SERPL-SCNC: 140 MMOL/L (ref 136–145)
SP GR UR STRIP.AUTO: 1.02
T3FREE SERPL-MCNC: 3.4 PG/ML (ref 2.3–4.2)
TRIGL SERPL-MCNC: 100 MG/DL (ref 0–149)
TSH SERPL-ACNC: 0.76 MIU/L (ref 0.44–3.98)
UROBILINOGEN UR STRIP.AUTO-MCNC: NORMAL MG/DL
VLDL: 20 MG/DL (ref 0–40)
WBC # BLD AUTO: 5.2 X10*3/UL (ref 4.4–11.3)

## 2024-07-22 PROCEDURE — 81003 URINALYSIS AUTO W/O SCOPE: CPT

## 2024-07-22 PROCEDURE — 99213 OFFICE O/P EST LOW 20 MIN: CPT | Performed by: NURSE PRACTITIONER

## 2024-07-22 PROCEDURE — 36415 COLL VENOUS BLD VENIPUNCTURE: CPT

## 2024-07-22 PROCEDURE — 82306 VITAMIN D 25 HYDROXY: CPT

## 2024-07-22 PROCEDURE — 93000 ELECTROCARDIOGRAM COMPLETE: CPT | Performed by: INTERNAL MEDICINE

## 2024-07-22 PROCEDURE — 3008F BODY MASS INDEX DOCD: CPT | Performed by: NURSE PRACTITIONER

## 2024-07-22 PROCEDURE — 80053 COMPREHEN METABOLIC PANEL: CPT

## 2024-07-22 PROCEDURE — 80061 LIPID PANEL: CPT

## 2024-07-22 PROCEDURE — 84443 ASSAY THYROID STIM HORMONE: CPT

## 2024-07-22 PROCEDURE — 84481 FREE ASSAY (FT-3): CPT

## 2024-07-22 PROCEDURE — 1036F TOBACCO NON-USER: CPT | Performed by: NURSE PRACTITIONER

## 2024-07-22 PROCEDURE — 85025 COMPLETE CBC W/AUTO DIFF WBC: CPT

## 2024-07-22 PROCEDURE — 84153 ASSAY OF PSA TOTAL: CPT

## 2024-07-22 RX ORDER — ERGOCALCIFEROL 1.25 MG/1
50000 CAPSULE ORAL
Qty: 12 CAPSULE | Refills: 1 | Status: SHIPPED | OUTPATIENT
Start: 2024-07-28 | End: 2025-07-28

## 2024-07-22 NOTE — PROGRESS NOTES
"CARDIOLOGY OFFICE VISIT      CHIEF COMPLAINT  Chief Complaint   Patient presents with    Follow-up     Pt is here today following up after 6 months    Complaint: \"I think I had that episode where he passed out because I was dehydrated.\"    HISTORY OF PRESENT ILLNESS  HPI  History: The patient is a 54-year-old  male who is followed for 1 yinka syncopal episode.  He states that he was severely dehydrated on the day in which he experienced the syncopal episode.  He underwent implantation of a loop recorder which was subsequently removed due to infection.  He has a history of hypothyroidism and hyperlipidemia as well as marijuana use.   He is accompanied by his wife who states they have been focusing on improving hydration.  Patient states that he does drink 2 cups of coffee per day and is also supplementing with Gatorade.  He denies chest pain, palpitations, shortness of breath, abdominal distention, or lower extremity edema.  He does experience rare episodes of transient lightheadedness.  He denies any additional near or yinka syncopal episodes.  Past Medical History  Past Medical History:   Diagnosis Date    Encounter for general adult medical examination without abnormal findings     Health care maintenance    Other specified abnormal findings of blood chemistry     High serum cholestanol    Personal history of other diseases of the digestive system 12/03/2019    History of gastroesophageal reflux (GERD)    Personal history of other diseases of the respiratory system     History of pharyngitis    Personal history of other specified conditions     History of abdominal pain    Personal history of other specified conditions 10/18/2016    History of diarrhea    Personal history of other specified conditions     History of dysphagia    Personal history of other specified conditions     History of syncope       Social History  Social History     Tobacco Use    Smoking status: Former     Current packs/day: 0.00    "  Types: Cigarettes     Quit date: 2023     Years since quittin.1    Smokeless tobacco: Never   Vaping Use    Vaping status: Every Day    Substances: Nicotine    Devices: Disposable   Substance Use Topics    Alcohol use: Yes     Comment: occasionally    Drug use: Not Currently       Family History     Family History   Problem Relation Name Age of Onset    Hypertension Mother      Coronary artery disease Father      Alzheimer's disease Father      Dementia Father      Diabetes Other grandparent         Allergies:  No Known Allergies     Outpatient Medications:  Current Outpatient Medications   Medication Instructions    atorvastatin (LIPITOR) 10 mg, oral, Daily    famotidine (Pepcid) 40 mg tablet TAKE 1 TABLET BY MOUTH EVERYDAY AT BEDTIME    levothyroxine (SYNTHROID, LEVOXYL) 100 mcg, oral, Daily          REVIEW OF SYSTEMS  Review of Systems   All other systems reviewed and are negative.        VITALS  Vitals:    24 1500   BP: 120/62   Pulse: 71       PHYSICAL EXAM  Vitals and nursing note reviewed.   Constitutional:       Appearance: Normal appearance.   HENT:      Head: Normocephalic.   Neck:      Vascular: No JVD. Carotid upstrokes II/IV.  Cardiovascular:      Rate and Rhythm: Normal rate and regular rhythm.      Pulses: Normal pulses.      Heart sounds: Normal S1 S2, no S3 S4.  No murmurs or rubs.  Pulmonary:      Effort: Pulmonary effort is normal. Respirations regular and nonlabored.     Breath sounds: Clear to auscultation posterior laterally.  Abdominal:      General: Bowel sounds are normal.      Palpations: Abdomen is soft.   Musculoskeletal:         General: Normal range of motion.      Cervical back: Normal range of motion.   Skin:     General: Skin is warm and dry.   Neurological:      General: No focal deficit present.      Mental Status: Alert and oriented to person, place, and time.      Motor: Motor function is intact.   Psychiatric:         Attention and Perception: Attention and  perception normal.         Mood and Affect: Mood and affect normal.         Speech: Speech normal.         Behavior: Behavior normal. Behavior is cooperative.         Thought Content: Thought content normal.         Cognition and Memory: Cognition and memory normal.     Labs and testing: Twelve-lead EKG reveals sinus rhythm without ectopics and no acute ischemic changes.  QRS duration is 88 ms,  ms, QTc 406 ms.  Myoview stress test dated August 10, 2023 reveals an ejection fraction of 67% with no acute ischemic changes or infarct patterns.  2D echocardiogram dated August 7, 2023 revealed an ejection fraction of 60% with trace MR and TR.  Carotid duplex scan dated August 5, 2023 revealed less than 50% bilateral internal carotid artery stenosis.      ASSESSMENT AND PLAN    Clinical impressions:  1.  Syncope secondary to dehydration with no clinical recurrence.  2.  Status post loop recorder implant with subsequent removal due to infection in August, 2023.  3.  Negative Myoview stress test dated August 10, 2023 for ischemia or infarct patterns with normal left ventricular function, ejection fraction 67%.  4.  Structurally normal heart with normal left ventricular function per 2D echocardiogram dated August 7, 2023 with only trace MR and TR.  5.  Carotid duplex scan dated August 5, 2023 revealing less than 50% bilateral internal carotid artery stenosis.  6.  Dyslipidemia on statin.  7.  Hypothyroidism on replacement therapy.  8.  Gastroesophageal reflux.  9.  History of marijuana use.  10.  Overweight with a BMI of 27.55.    Recommendations:  1.  Continue current medications as prescribed.  2.  Lifestyle modifications were reviewed in detail.  Patient was encouraged to continue to drink 64 ounces of water per day and limit caffeine and alcohol consumption to 2 servings or less per day.  3.  Continue activity and exercise with recommendation for exercise for 30 minutes-5 times per week.  4.  Follow-up with  electrophysiology as needed only.    Evaluation and note by Alejandra Segura, CNP  **Please excuse any errors in grammar or translation related to this dictation.  Voice recognition software was utilized to prepare this document.**

## 2024-08-01 ASSESSMENT — ENCOUNTER SYMPTOMS
ABDOMINAL PAIN: 0
SORE THROAT: 0
MYALGIAS: 0
COUGH: 0
DYSURIA: 0
ACTIVITY CHANGE: 0
LIGHT-HEADEDNESS: 0

## 2024-08-01 NOTE — PROGRESS NOTES
"CHIEF COMPLAINT:    Chief Complaint   Patient presents with    Follow-up     Patient here for medical management, previous Dr. Daly patient. Patient stated that he had a loop recorder in the past for 7 weeks, but did not want to get it put back in. Stated that he has a history of syncope. Patient has a follow-up with Cardiology.   Requesting lab order to have thyroid checked.         HISTORY OF PRESENT ILLNESS:  Erick Jean  is a pleasant 54 y.o. male Here to establish primary care physician.  He is to see Dr. Julian in the office.  Overall is doing well.  He does have hypothyroidism and hyperlipidemia.  He needs his blood work done.  Patient had a syncopal attack.  Since then he has been given a loop recorder.  He will be following up with cardiology.  He does not have any acute medical complaint today.  He needs his annual blood work.  His cancer screening tests are up-to-date.      Review of Systems   Constitutional:  Negative for activity change.   HENT:  Negative for congestion and sore throat.    Respiratory:  Negative for cough.    Cardiovascular:  Negative for chest pain.   Gastrointestinal:  Negative for abdominal pain.   Endocrine: Negative for polyuria.   Genitourinary:  Negative for dysuria.   Musculoskeletal:  Negative for myalgias.   Skin:  Negative for rash.   Neurological:  Negative for light-headedness.   Psychiatric/Behavioral:  Negative for behavioral problems.      Visit Vitals  /74 (BP Location: Left arm, Patient Position: Sitting, BP Cuff Size: Adult)   Pulse 68   Temp 36.8 °C (98.3 °F) (Temporal)   Ht 1.778 m (5' 10\")   Wt 86.4 kg (190 lb 6.4 oz)   SpO2 97%   BMI 27.32 kg/m²   Smoking Status Former   BSA 2.07 m²         Wt Readings from Last 10 Encounters:   07/22/24 87.1 kg (192 lb)   07/09/24 86.4 kg (190 lb 6.4 oz)   02/12/24 89.4 kg (197 lb)   12/20/23 88.5 kg (195 lb)   10/12/23 87.1 kg (192 lb)   10/11/23 87.1 kg (192 lb)   09/05/23 84.8 kg (187 lb)   08/08/23 83.6 kg (184 lb 4 " oz)   07/15/22 82.7 kg (182 lb 5 oz)   01/17/22 86.2 kg (190 lb)       Physical Exam  Vitals and nursing note reviewed.   Constitutional:       Appearance: Normal appearance.   HENT:      Head: Normocephalic.      Right Ear: Tympanic membrane normal.      Left Ear: Tympanic membrane normal.      Nose: Nose normal.      Mouth/Throat:      Mouth: Mucous membranes are moist.   Cardiovascular:      Rate and Rhythm: Normal rate and regular rhythm.      Pulses: Normal pulses.      Heart sounds: No murmur heard.  Pulmonary:      Effort: No respiratory distress.      Breath sounds: Normal breath sounds.   Abdominal:      Palpations: Abdomen is soft.   Musculoskeletal:      Cervical back: Neck supple.      Right lower leg: No edema.      Left lower leg: No edema.   Skin:     General: Skin is warm.      Findings: No rash.   Neurological:      General: No focal deficit present.      Mental Status: He is alert and oriented to person, place, and time.   Psychiatric:         Mood and Affect: Mood normal.        RECENT LABS:  Lab Results   Component Value Date    WBC 5.2 07/22/2024    HGB 14.3 07/22/2024    HCT 42.9 07/22/2024     07/22/2024    CHOL 138 07/22/2024    TRIG 100 07/22/2024    HDL 40.0 07/22/2024    ALT 19 07/22/2024    AST 18 07/22/2024     07/22/2024    K 4.2 07/22/2024     07/22/2024    CREATININE 1.02 07/22/2024    BUN 13 07/22/2024    CO2 27 07/22/2024    TSH 0.76 07/22/2024    PSA 0.74 07/22/2024    INR 1.0 09/27/2023     Lab Results   Component Value Date    GLUCOSE 92 07/22/2024    CALCIUM 8.9 07/22/2024     07/22/2024    K 4.2 07/22/2024    CO2 27 07/22/2024     07/22/2024    BUN 13 07/22/2024    CREATININE 1.02 07/22/2024        IMAGING:  === 08/12/22 ===  CHEST 2 VIEW  1.  No evidence of acute cardiopulmonary process.     === 08/12/22 ===  CT HEART CALCIUM SCORING WO IV CONTRAST  1. Coronary artery calcium score of  0*.  Coronary artery score            Annual Risk    0-99                                0.4%  100-399                          1.3%  >400                              2.4%    MEDICATIONS:   Current Outpatient Medications   Medication Instructions    atorvastatin (LIPITOR) 10 mg, oral, Daily    ergocalciferol (VITAMIN D-2) 50,000 Units, oral, Once Weekly    famotidine (Pepcid) 40 mg tablet TAKE 1 TABLET BY MOUTH EVERYDAY AT BEDTIME    levothyroxine (SYNTHROID, LEVOXYL) 100 mcg, oral, Daily        TODAY'S VISIT  DX:   1. Acquired hypothyroidism  Triiodothyronine, Free    TSH with reflex to Free T4 if abnormal      2. Inadequate intake of calcium and vitamin D  Vitamin D 25-Hydroxy,Total (for eval of Vitamin D levels)      3. Screening for blood or protein in urine  Urinalysis with Reflex Microscopic      4. Screening for prostate cancer  Prostate Specific Antigen      5. Syncope, unspecified syncope type  CBC and Auto Differential    Comprehensive Metabolic Panel    Compression Stockings 20-30 mmHg      6. Hypercholesterolemia  Lipid Panel           MEDICAL DECISION MAKING:  - The current and active medical co morbidities have been considered.   - Recent lab work and relevant imaging studies have been reviewed.    - Relevant correspondence/notes from other specialty consultants were reviewed.    - Medication have been sent for refill.    - Next Follow up in 3 months  - Patient was given treatment as per above plan

## 2025-02-06 ENCOUNTER — APPOINTMENT (OUTPATIENT)
Dept: PRIMARY CARE | Facility: CLINIC | Age: 55
End: 2025-02-06
Payer: COMMERCIAL

## 2025-02-06 VITALS
SYSTOLIC BLOOD PRESSURE: 126 MMHG | BODY MASS INDEX: 26.49 KG/M2 | OXYGEN SATURATION: 99 % | DIASTOLIC BLOOD PRESSURE: 85 MMHG | TEMPERATURE: 98.7 F | HEART RATE: 77 BPM | WEIGHT: 184.6 LBS

## 2025-02-06 DIAGNOSIS — E58 INADEQUATE INTAKE OF CALCIUM AND VITAMIN D: ICD-10-CM

## 2025-02-06 DIAGNOSIS — K21.9 GERD WITHOUT ESOPHAGITIS: ICD-10-CM

## 2025-02-06 DIAGNOSIS — E03.9 HYPOTHYROIDISM, UNSPECIFIED TYPE: ICD-10-CM

## 2025-02-06 DIAGNOSIS — R55 SYNCOPE, UNSPECIFIED SYNCOPE TYPE: ICD-10-CM

## 2025-02-06 DIAGNOSIS — E78.00 HYPERCHOLESTEROLEMIA: ICD-10-CM

## 2025-02-06 DIAGNOSIS — Z12.5 SCREENING FOR PROSTATE CANCER: ICD-10-CM

## 2025-02-06 DIAGNOSIS — Z13.89 SCREENING FOR BLOOD OR PROTEIN IN URINE: ICD-10-CM

## 2025-02-06 DIAGNOSIS — Z00.00 WELLNESS EXAMINATION: Primary | ICD-10-CM

## 2025-02-06 DIAGNOSIS — Z12.11 SCREENING FOR COLON CANCER: ICD-10-CM

## 2025-02-06 DIAGNOSIS — E55.9 INADEQUATE INTAKE OF CALCIUM AND VITAMIN D: ICD-10-CM

## 2025-02-06 PROBLEM — B35.1 ONYCHOMYCOSIS OF TOENAIL: Status: RESOLVED | Noted: 2023-10-06 | Resolved: 2025-02-06

## 2025-02-06 PROBLEM — E66.3 OVERWEIGHT WITH BODY MASS INDEX (BMI) OF 27 TO 27.9 IN ADULT: Status: RESOLVED | Noted: 2023-10-06 | Resolved: 2025-02-06

## 2025-02-06 PROCEDURE — 99396 PREV VISIT EST AGE 40-64: CPT | Performed by: INTERNAL MEDICINE

## 2025-02-06 RX ORDER — ATORVASTATIN CALCIUM 10 MG/1
10 TABLET, FILM COATED ORAL DAILY
Qty: 90 TABLET | Refills: 3 | Status: SHIPPED | OUTPATIENT
Start: 2025-02-06 | End: 2026-02-06

## 2025-02-06 RX ORDER — LEVOTHYROXINE SODIUM 100 UG/1
100 TABLET ORAL DAILY
Qty: 90 TABLET | Refills: 3 | Status: SHIPPED | OUTPATIENT
Start: 2025-02-06 | End: 2026-02-06

## 2025-02-06 RX ORDER — FAMOTIDINE 40 MG/1
TABLET, FILM COATED ORAL
Qty: 90 TABLET | Refills: 3 | Status: SHIPPED | OUTPATIENT
Start: 2025-02-06

## 2025-02-10 ENCOUNTER — TELEPHONE (OUTPATIENT)
Dept: GASTROENTEROLOGY | Facility: EXTERNAL LOCATION | Age: 55
End: 2025-02-10
Payer: COMMERCIAL

## 2025-03-12 ENCOUNTER — CLINICAL SUPPORT (OUTPATIENT)
Dept: PREADMISSION TESTING | Facility: HOSPITAL | Age: 55
End: 2025-03-12
Payer: COMMERCIAL

## 2025-03-12 VITALS — BODY MASS INDEX: 26.54 KG/M2 | WEIGHT: 185 LBS

## 2025-03-12 NOTE — PREPROCEDURE INSTRUCTIONS

## 2025-03-26 ENCOUNTER — ANESTHESIA EVENT (OUTPATIENT)
Dept: GASTROENTEROLOGY | Facility: HOSPITAL | Age: 55
End: 2025-03-26
Payer: COMMERCIAL

## 2025-03-26 ENCOUNTER — HOSPITAL ENCOUNTER (OUTPATIENT)
Dept: GASTROENTEROLOGY | Facility: HOSPITAL | Age: 55
Discharge: HOME | End: 2025-03-26
Payer: COMMERCIAL

## 2025-03-26 ENCOUNTER — ANESTHESIA (OUTPATIENT)
Dept: GASTROENTEROLOGY | Facility: HOSPITAL | Age: 55
End: 2025-03-26
Payer: COMMERCIAL

## 2025-03-26 VITALS
SYSTOLIC BLOOD PRESSURE: 128 MMHG | BODY MASS INDEX: 24.46 KG/M2 | RESPIRATION RATE: 16 BRPM | TEMPERATURE: 97.3 F | HEIGHT: 70 IN | OXYGEN SATURATION: 99 % | DIASTOLIC BLOOD PRESSURE: 82 MMHG | HEART RATE: 61 BPM | WEIGHT: 170.86 LBS

## 2025-03-26 DIAGNOSIS — Z12.11 SCREENING FOR COLON CANCER: ICD-10-CM

## 2025-03-26 PROCEDURE — 7100000010 HC PHASE TWO TIME - EACH INCREMENTAL 1 MINUTE

## 2025-03-26 PROCEDURE — 3700000001 HC GENERAL ANESTHESIA TIME - INITIAL BASE CHARGE

## 2025-03-26 PROCEDURE — 2500000004 HC RX 250 GENERAL PHARMACY W/ HCPCS (ALT 636 FOR OP/ED): Performed by: NURSE ANESTHETIST, CERTIFIED REGISTERED

## 2025-03-26 PROCEDURE — 45380 COLONOSCOPY AND BIOPSY: CPT | Performed by: STUDENT IN AN ORGANIZED HEALTH CARE EDUCATION/TRAINING PROGRAM

## 2025-03-26 PROCEDURE — 7100000009 HC PHASE TWO TIME - INITIAL BASE CHARGE

## 2025-03-26 PROCEDURE — 2500000001 HC RX 250 WO HCPCS SELF ADMINISTERED DRUGS (ALT 637 FOR MEDICARE OP): Performed by: STUDENT IN AN ORGANIZED HEALTH CARE EDUCATION/TRAINING PROGRAM

## 2025-03-26 PROCEDURE — 45385 COLONOSCOPY W/LESION REMOVAL: CPT | Performed by: STUDENT IN AN ORGANIZED HEALTH CARE EDUCATION/TRAINING PROGRAM

## 2025-03-26 PROCEDURE — 3700000002 HC GENERAL ANESTHESIA TIME - EACH INCREMENTAL 1 MINUTE

## 2025-03-26 RX ORDER — PROPOFOL 10 MG/ML
INJECTION, EMULSION INTRAVENOUS AS NEEDED
Status: DISCONTINUED | OUTPATIENT
Start: 2025-03-26 | End: 2025-03-26

## 2025-03-26 RX ORDER — LIDOCAINE HYDROCHLORIDE 20 MG/ML
INJECTION, SOLUTION INFILTRATION; PERINEURAL AS NEEDED
Status: DISCONTINUED | OUTPATIENT
Start: 2025-03-26 | End: 2025-03-26

## 2025-03-26 RX ORDER — DEXTROMETHORPHAN/PSEUDOEPHED 2.5-7.5/.8
DROPS ORAL AS NEEDED
Status: COMPLETED | OUTPATIENT
Start: 2025-03-26 | End: 2025-03-26

## 2025-03-26 RX ADMIN — PROPOFOL 50 MG: 10 INJECTION, EMULSION INTRAVENOUS at 11:37

## 2025-03-26 RX ADMIN — PROPOFOL 50 MG: 10 INJECTION, EMULSION INTRAVENOUS at 11:33

## 2025-03-26 RX ADMIN — SODIUM CHLORIDE, POTASSIUM CHLORIDE, SODIUM LACTATE AND CALCIUM CHLORIDE: 600; 310; 30; 20 INJECTION, SOLUTION INTRAVENOUS at 11:10

## 2025-03-26 RX ADMIN — PROPOFOL 50 MG: 10 INJECTION, EMULSION INTRAVENOUS at 11:43

## 2025-03-26 RX ADMIN — PROPOFOL 100 MG: 10 INJECTION, EMULSION INTRAVENOUS at 11:31

## 2025-03-26 RX ADMIN — SIMETHICONE 40 MG: 20 EMULSION ORAL at 11:39

## 2025-03-26 RX ADMIN — LIDOCAINE HYDROCHLORIDE 5 ML: 20 INJECTION, SOLUTION INFILTRATION; PERINEURAL at 11:31

## 2025-03-26 SDOH — HEALTH STABILITY: MENTAL HEALTH: CURRENT SMOKER: 0

## 2025-03-26 ASSESSMENT — PAIN SCALES - GENERAL
PAINLEVEL_OUTOF10: 0 - NO PAIN

## 2025-03-26 ASSESSMENT — COLUMBIA-SUICIDE SEVERITY RATING SCALE - C-SSRS
2. HAVE YOU ACTUALLY HAD ANY THOUGHTS OF KILLING YOURSELF?: NO
6. HAVE YOU EVER DONE ANYTHING, STARTED TO DO ANYTHING, OR PREPARED TO DO ANYTHING TO END YOUR LIFE?: NO
1. IN THE PAST MONTH, HAVE YOU WISHED YOU WERE DEAD OR WISHED YOU COULD GO TO SLEEP AND NOT WAKE UP?: NO

## 2025-03-26 ASSESSMENT — PAIN - FUNCTIONAL ASSESSMENT
PAIN_FUNCTIONAL_ASSESSMENT: 0-10

## 2025-03-26 NOTE — ANESTHESIA PREPROCEDURE EVALUATION
Patient: Erick Jean    Procedure Information       Date/Time: 03/26/25 1150    Scheduled providers: Garrett Hernandez DO; Augusto Quach MD; Courtney Horn RN; Stephani Estrada    Procedure: COLONOSCOPY    Location: [facility]            Relevant Problems   Cardiac   (+) Hypercholesterolemia      GI   (+) GERD without esophagitis      Endocrine   (+) Hypothyroidism       Clinical information reviewed:   Tobacco  Allergies   Problems  Med Hx  Surg Hx   Fam Hx          NPO Detail:  No data recorded     Physical Exam    Airway  Mallampati: II  TM distance: >3 FB  Neck ROM: full     Cardiovascular - normal exam     Dental    Pulmonary - normal exam     Abdominal - normal exam             Anesthesia Plan    History of general anesthesia?: yes  History of complications of general anesthesia?: no    ASA 2     MAC     The patient is not a current smoker.  Patient did not smoke on day of procedure.    intravenous induction   Anesthetic plan and risks discussed with patient.    Plan discussed with CRNA and attending.

## 2025-03-26 NOTE — ANESTHESIA POSTPROCEDURE EVALUATION
Patient: Erick Jean    Procedure Summary       Date: 03/26/25 Room / Location: [facility]    Anesthesia Start: 1124 Anesthesia Stop: 1151    Procedure: COLONOSCOPY Diagnosis: Screening for colon cancer    Scheduled Providers: Garrett Hernandez DO; Augusto Quach MD; Courtney Horn RN; Stephani Estrada Responsible Provider: Carole Prado MD    Anesthesia Type: MAC ASA Status: 2            Anesthesia Type: MAC    Anesthesia Post Evaluation    Patient location during evaluation: bedside  Patient participation: complete - patient participated  Level of consciousness: awake and alert  Pain management: adequate  Airway patency: patent  Cardiovascular status: acceptable  Respiratory status: acceptable  Hydration status: acceptable  Postoperative Nausea and Vomiting: none      No notable events documented.

## 2025-03-26 NOTE — H&P
[facility] [place] Procedure H&P    Patient Profile  Name Erick Jean  Date of Birth 1970  MRN [id number]  PCP Daniel Desai        Diagnosis: Colon cancer screening.  Procedure(s):  Colonoscopy.    Allergies  No Known Allergies    Past Medical History   Past Medical History:   Diagnosis Date    Encounter for general adult medical examination without abnormal findings     Health care maintenance    Hypothyroidism     Other specified abnormal findings of blood chemistry     High serum cholestanol    Personal history of other diseases of the digestive system 12/03/2019    History of gastroesophageal reflux (GERD)    Personal history of other diseases of the respiratory system     History of pharyngitis    Personal history of other specified conditions     History of abdominal pain    Personal history of other specified conditions 10/18/2016    History of diarrhea    Personal history of other specified conditions     History of dysphagia    Personal history of other specified conditions     History of syncope-undetermined cause       Medication Reviewed - yes  Prior to Admission medications    Medication Sig Start Date End Date Taking? Authorizing Provider   atorvastatin (Lipitor) 10 mg tablet Take 1 tablet (10 mg) by mouth once daily. 2/6/25 2/6/26 Yes Raz Coto MD   cholecalciferol, vitamin D3, (VITAMIN D3 ORAL) Take 1 tablet by mouth once every day.   Yes Historical Provider, MD   famotidine (Pepcid) 40 mg tablet TAKE 1 TABLET BY MOUTH EVERYDAY AT BEDTIME 2/6/25  Yes Raz Coto MD   levothyroxine (Synthroid, Levoxyl) 100 mcg tablet Take 1 tablet (100 mcg) by mouth once daily. 2/6/25 2/6/26 Yes Raz Coto MD       Physical Exam  Vitals:    03/26/25 1041   BP: 100/80   Pulse: 67   Resp: 16   Temp: 36.2 °C (97.2 °F)   SpO2: 96%      Weight   Vitals:    03/26/25 1041   Weight: 77.5 kg (170 lb 13.7 oz)     BMI Body mass index is 24.52 kg/m².    General: A&Ox3, NAD.  HEENT: AT/NC.   CV: RRR.    Resp: CTA bilaterally. No wheezing, rhonchi or rales.   GI: Soft, NT/ND.  Extrem: No edema. Pulses intact.  Skin: No Jaundice.   Neuro: No focal deficits.   Psych: Normal mood and affect.        Sedation Plan: Deep Sedation.  Procedure Plan - pre-procedural (re)assesment completed by physician:  discharge/transfer patient when discharge criteria met    Garrett Hernandez DO  3/26/2025 10:48 AM

## 2025-03-26 NOTE — Clinical Note
Huddle and Timeout completed together with team. Patient wristband and YIN information verified.  Anesthesia safety check completed. Patient was alert

## 2025-03-26 NOTE — DISCHARGE INSTRUCTIONS
Physician phone number provided to patientModerate Sedation in Adults Discharge Instructions    About this topic  Moderate sedation is also known as conscious sedation. It changes your state of being awake or consciousness. With this sedation, you may feel slight pain or pressure during a procedure. The drugs help you to relax and may even allow you to sleep. It will be easy to wake you and you may talk and answer questions while under sedation. Most likely, you will not remember what happens while under this sedation.  What care is needed at home?  Ask your doctor what you need to do when you go home. Make sure you understand everything the doctor says. This way you will know what you need to do.  You will not be allowed to drive right away after the procedure. Ask a family member or a friend to drive you home.  Do not operate heavy or dangerous machinery for at least 24 hours.  Do not make major decisions or sign important papers for at least 24 hours. You may not be thinking clearly.  Avoid beer, wine, or mixed drinks (alcohol) for at least 24 hours.  You are at a higher risk of falling for at least 24 hours after moderate sedation.  Take extra care when you get up.  Do not change positions quickly.  Do not rush when you need to go to the bathroom or to answer the phone.  Ask for help if you feel unsteady when you try to walk.  Wear shoes with non-slip soles and low heels.  What follow-up care is needed?  Your doctor may ask you to make visits to the office to check on your progress. Be sure to keep these visits. Your doctor may also refer you to other doctors or tell you that you need more tests or care.  What drugs may be needed?  The doctor may order drugs to:  Help with pain  Treat an upset stomach or throwing up  Will physical activity be limited?  Rest for the day of the procedure. Avoid strenuous activities like heavy lifting and hard exercise. Talk to your doctor about whether you need to limit lifting or  exercise after your procedure.  What changes to diet are needed?  Start with a light diet when you are fully awake. This includes things that are easy to swallow like soups, pudding, jello, toast, and eggs. Slowly progress to your normal diet.  What problems could happen?  Low blood pressure  Breathing problems  Upset stomach or throwing up  Dizziness  When do I need to call the doctor?  Feel dizzy, weak, or tired  Faint  Very bad headache  Upset stomach or throwing up  To follow up for more tests or care  Teach Back: Helping You Understand  The Teach Back Method helps you understand the information we are giving you. After you talk with the staff, tell them in your own words what you learned. This helps to make sure the staff has described each thing clearly. It also helps to explain things that may have been confusing. Before going home, make sure you can do these:  I can tell you about my procedure.  I can tell you if I need more tests or care.  I can tell you what is good for me to eat and drink the next day.  I can tell you what I would do if I feel dizzy, weak, or tired.  Last Reviewed Date  2020-03-02     Patient Instructions after a Colonoscopy      The anesthetics, sedatives or narcotics which were given to you today will be acting in your body for the next 24 hours, so you might feel a little sleepy or groggy.  This feeling should slowly wear off. Carefully read and follow the instructions.     You received sedation today:  - Do not drive or operate any machinery or power tools of any kind.   - No alcoholic beverages today, not even beer or wine.  - Do not make any important decisions or sign any legal documents.  - No over the counter medications that contain alcohol or that may cause drowsiness.  - Do not make any important decisions or sign any legal documents.    While it is common to experience mild to moderate abdominal distention, gas, or belching after your procedure, if any of these symptoms occur  following discharge from the GI Lab or within one week of having your procedure, call the [facility] to be advised whether a visit to your nearest Urgent Care or Emergency Department is indicated.  Take this paper with you if you go.     - If you develop an allergic reaction to the medications that were given during your procedure such as difficulty breathing, rash, hives, severe nausea, vomiting or lightheadedness.  - If you experience chest pain, shortness of breath, severe abdominal pain, fevers and chills.  -If you develop signs and symptoms of bleeding such as blood in your spit, if your stools turn black, tarry, or bloody  - If you have not urinated within 8 hours following your procedure.  - If your IV site becomes painful, red, inflamed, or looks infected.    If you received a biopsy/polypectomy/sphincterotomy the following instructions apply below:    __ Do not use Aspirin containing products, non-steroidal medications or anti-coagulants for one week following your procedure. (Examples of these types of medications are: Advil, Arthrotec, Aleve, Coumadin, Ecotrin, Heparin, Ibuprofen, Indocin, Motrin, Naprosyn, Nuprin, Plavix, Vioxx, and Voltarin, or their generic forms.  This list is not all-inclusive.  Check with your physician or pharmacist before resuming medications.)   __ Eat a soft diet today.  Avoid foods that are poorly digested for the next 24 hours.  These foods would include: nuts, beans, lettuce, red meats, and fried foods. Start with liquids and advance your diet as tolerated, gradually work up to eating solids.   __ Do not have a Barium Study or Enema for one week.    Your physician recommends the additional following instructions:    -You have a contact number available for emergencies. The signs and symptoms of potential delayed complications were discussed with you. You may return to normal activities tomorrow.  -Resume your previous diet.  -Continue your present medications.    -We are waiting for your pathology results.  -Your physician has recommended a repeat colonoscopy (date to be determined after pending pathology results are reviewed) for surveillance based on pathology results.  -The findings and recommendations have been discussed with you.  -The findings and recommendations were discussed with your family.  - Please see Medication Reconciliation Form for new medication/medications prescribed.       If you experience any problems or have any questions following discharge from the GI Lab, please call:  Before 5p.m.  [phone]  After 5p.m.    [phone]    Nurse Signature                                                                        Date___________________                                                                            Patient/Responsible Party Signature                                        Date___________________

## 2025-03-31 NOTE — PROGRESS NOTES
CHIEF COMPLAINT:   Annual Wellness Checkup       HISTORY OF PRESENT ILLNESS:   Erick Jean comes for annual medical check up.  No acute medical complaint today. Overall doing well. . Chronic medical conditions are stable with current medical management. Cognitive function is assessed. Immunizations are age appropriate. Home medications have been reconciled. The healthy lifestyle has been reinforced. Encouraged continued avoidance of tobacco, alcohol, poly pharmacy and substances. Functional capacity has been assessed. Discussed about fall risk and safety measures, at home and outside.  Age appropriate cancer screening tests were recommended. Reminded about code status and health care Power of  (POA). Discussed about mental health and wellbeing. The depression screening questionnaire  (PHQ 9) was discussed for 5 mins. Vital signs, recent lab results, imaging studies were reviewed. At the end patient raised the concern about gastroesophageal reflux disorder, hyperlipidemia and syncope.  A complete physical exams was performed to evaluate those conditions.    Review of Systems   Constitutional:  No activity change or fever   HENT:  Denies ringing ears or nose bleed   Respiratory:  Denies stridor. No blood in sputum   Cardiovascular:  Denies chest pain, no sudden excessive sweating   Gastrointestinal:  No sour burping, no blood in stool    Genitourinary:  Denies blood in urine    Musculoskeletal:  No joint redness or swelling    Skin:  No new spot changing color or shape or border    Neurological:  No speech difficulty, facial droop    Psychiatric/Behavioral:  No agitation, denies Hallucination       Visit Vitals  /85 (BP Location: Left arm, Patient Position: Sitting, BP Cuff Size: Adult)   Pulse 77   Temp 37.1 °C (98.7 °F) (Temporal)   Wt 83.7 kg (184 lb 9.6 oz)   SpO2 99% Comment: RA   BMI 26.49 kg/m²   Smoking Status Former   BSA 2.03 m²           Wt Readings from Last 10 Encounters:   03/26/25 77.5  "kg (170 lb 13.7 oz)   03/12/25 83.9 kg (185 lb)   02/06/25 83.7 kg (184 lb 9.6 oz)   07/22/24 87.1 kg (192 lb)   07/09/24 86.4 kg (190 lb 6.4 oz)   02/12/24 89.4 kg (197 lb)   12/20/23 88.5 kg (195 lb)   10/12/23 87.1 kg (192 lb)   10/11/23 87.1 kg (192 lb)   09/05/23 84.8 kg (187 lb)        PHYSICAL EXAM:  Gen: Alert, awake, Oriented X 3. Not in any acute distress   HEENT:  Atraumatic, PERRL.  Conjunctivae clear.   Moist nasal mucous membranes. oropharynx non erythematous,   Neck:  Supple without thyromegaly or lymphadenopathy.  Lungs:  Clear to auscultation without rales, rhonchi, or rub.  Heart:  RRR, S1, S2, without M.  Abdomen:  Soft, non tender, no organ enlargement, bruit. Bowel sounds present . No CVA tenderness.  Extremities:  No edema. No calf swelling or tenderness.    Skin:  No rash, ecchymosis or erythema.    RECENT LABS:  Lab Results   Component Value Date    WBC 5.2 07/22/2024    HGB 14.3 07/22/2024    HCT 42.9 07/22/2024     07/22/2024    CHOL 138 07/22/2024    TRIG 100 07/22/2024    HDL 40.0 07/22/2024    ALT 19 07/22/2024    AST 18 07/22/2024     07/22/2024    K 4.2 07/22/2024     07/22/2024    CREATININE 1.02 07/22/2024    BUN 13 07/22/2024    CO2 27 07/22/2024    TSH 0.76 07/22/2024    PSA 0.74 07/22/2024    INR 1.0 09/27/2023     Lab Results   Component Value Date    GLUCOSE 92 07/22/2024    CALCIUM 8.9 07/22/2024     07/22/2024    K 4.2 07/22/2024    CO2 27 07/22/2024     07/22/2024    BUN 13 07/22/2024    CREATININE 1.02 07/22/2024      Lab Results   Component Value Date    LDLCALC 78 07/22/2024     No results found for: \"HGBA1C\"  Lab Results   Component Value Date    LDLCALC 78 07/22/2024    CREATININE 1.02 07/22/2024       MEDICATIONS:   Current Outpatient Medications   Medication Instructions    atorvastatin (LIPITOR) 10 mg, oral, Daily    cholecalciferol, vitamin D3, (VITAMIN D3 ORAL) 1 tablet, Every Day    famotidine (Pepcid) 40 mg tablet TAKE 1 TABLET BY " MOUTH EVERYDAY AT BEDTIME    levothyroxine (SYNTHROID, LEVOXYL) 100 mcg, oral, Daily        TODAY'S VISIT  DX:     1. Wellness examination  Overall health is stable and at base line. Will continue with current medical therapy and plan.  Immunizations, cancer screening tests are age appropriately up to date.      2. Screening for colon cancer  Colonoscopy Screening; Average Risk Patient      3. Inadequate intake of calcium and vitamin D  Vitamin D 25-Hydroxy,Total (for eval of Vitamin D levels)    Vitamin D 25-Hydroxy,Total (for eval of Vitamin D levels)      4. Screening for blood or protein in urine  Urinalysis with Reflex Microscopic    Urinalysis with Reflex Microscopic      5. Screening for prostate cancer  Prostate Specific Antigen    Prostate Specific Antigen      6. Hypothyroidism, unspecified type  Triiodothyronine, Free    TSH with reflex to Free T4 if abnormal    levothyroxine (Synthroid, Levoxyl) 100 mcg tablet    Triiodothyronine, Free    TSH with reflex to Free T4 if abnormal      7. GERD without esophagitis  CBC and Auto Differential    famotidine (Pepcid) 40 mg tablet    CBC and Auto Differential      8. Hypercholesterolemia  Lipid Panel    atorvastatin (Lipitor) 10 mg tablet    Lipid Panel      9. Syncope, unspecified syncope type  Comprehensive Metabolic Panel    Comprehensive Metabolic Panel           MEDICAL DECISION MAKING:   - Relevant correspondence/notes from specialty consultants were reviewed.  - Active co morbidities conditions are stable for now.    - Cognitive function stable.    - Immunizations are age appropriately up to date.   - Preventative cancer screening tests are up-to-date.    - Medications have been sent for refill  - F/U in 6 months      PS: This note was completed using Dragon voice recognition technology and may include unintended errors with respect to translation of words, typographical errors or grammar errors which may not have been identified while finalizing the chart.

## 2025-04-04 LAB
LABORATORY COMMENT REPORT: NORMAL
PATH REPORT.FINAL DX SPEC: NORMAL
PATH REPORT.GROSS SPEC: NORMAL
PATH REPORT.RELEVANT HX SPEC: NORMAL
PATH REPORT.TOTAL CANCER: NORMAL

## 2025-04-17 ENCOUNTER — APPOINTMENT (OUTPATIENT)
Dept: GASTROENTEROLOGY | Facility: CLINIC | Age: 55
End: 2025-04-17
Payer: COMMERCIAL

## 2025-04-17 DIAGNOSIS — K52.9 ILEITIS: Primary | ICD-10-CM

## 2025-04-17 PROCEDURE — 99213 OFFICE O/P EST LOW 20 MIN: CPT | Performed by: STUDENT IN AN ORGANIZED HEALTH CARE EDUCATION/TRAINING PROGRAM

## 2025-04-17 NOTE — PROGRESS NOTES
CC: Colonoscopy/pathology follow up.    History of Present Illness:   Erick Jean is a 55 y.o. male with a PMH of GERD, HLD, hypothyroidism, marijuana use who presents to clinic for colonoscopy/pathology follow up. Colonoscopy showed mild TI inflammation. Pathology was unremarkable. Patient gets occasional severe episodes of diarrhea. Increased flatus. No other concerns. Rare NSAID use.     Colonoscopy 3/2025:   Erythematous mucosa with erosion in the terminal ileum s/p biopsies (normal biopsies).  Normal mucosa throughout the colon s/p biopsies (normal).  2 diminutive polyps were removed with cold snare (normal)  Scattered diverticulosis in the ascending and sigmoid colon.  Hemorrhoids + skin tags.    Review of Systems  ROS Negative unless otherwise stated above.    Past Medical/Surgical History  Medical History[1]   Surgical History[2]     Social History   reports that he quit smoking about 22 months ago. His smoking use included cigarettes. He has never used smokeless tobacco. He reports current alcohol use. He reports that he does not currently use drugs.     Family History  family history includes Alzheimer's disease in his father; Coronary artery disease in his father; Dementia in his father; Diabetes in an other family member; Hypertension in his mother.     Allergies  RX Allergies[3]    Medications  Current Outpatient Medications   Medication Instructions    atorvastatin (LIPITOR) 10 mg, oral, Daily    cholecalciferol, vitamin D3, (VITAMIN D3 ORAL) 1 tablet, Every Day    famotidine (Pepcid) 40 mg tablet TAKE 1 TABLET BY MOUTH EVERYDAY AT BEDTIME    levothyroxine (SYNTHROID, LEVOXYL) 100 mcg, oral, Daily        Objective   General: A&Ox3, NAD.  HEENT: AT/NC.   Skin: No Jaundice.   Neuro: No focal deficits.   Psych: Normal mood and affect.     Lab Results   Component Value Date    WBC 5.2 07/22/2024    HGB 14.3 07/22/2024    HCT 42.9 07/22/2024    MCV 90 07/22/2024     07/22/2024       Chemistry     Lab Results   Component Value Date/Time     07/22/2024 0826    K 4.2 07/22/2024 0826     07/22/2024 0826    CO2 27 07/22/2024 0826    BUN 13 07/22/2024 0826    CREATININE 1.02 07/22/2024 0826    Lab Results   Component Value Date/Time    CALCIUM 8.9 07/22/2024 0826    ALKPHOS 72 07/22/2024 0826    AST 18 07/22/2024 0826    ALT 19 07/22/2024 0826    BILITOT 0.6 07/22/2024 0826             ASSESSMENT/PLAN  Erick Jean is a 55 y.o. male with a PMH of GERD, HLD, hypothyroidism, marijuana use who presents to clinic for colonoscopy/pathology follow up. Colonoscopy showed mild TI inflammation (erosions). Pathology was unremarkable.     -Obtain CBC, CMP, CRP, ESR.  -Obtain fecal calprotectin.   -Avoid NSAIDs.  -Consider colonoscopy in 1 year to assure healing and no chronic inflammation (ie rule out IBD).    Total video visit time: 9 minutes.    Garrett Hernandez DO         [1]   Past Medical History:  Diagnosis Date    Encounter for general adult medical examination without abnormal findings     Health care maintenance    Hypothyroidism     Other specified abnormal findings of blood chemistry     High serum cholestanol    Personal history of other diseases of the digestive system 12/03/2019    History of gastroesophageal reflux (GERD)    Personal history of other diseases of the respiratory system     History of pharyngitis    Personal history of other specified conditions     History of abdominal pain    Personal history of other specified conditions 10/18/2016    History of diarrhea    Personal history of other specified conditions     History of dysphagia    Personal history of other specified conditions     History of syncope-undetermined cause   [2]   Past Surgical History:  Procedure Laterality Date    OTHER SURGICAL HISTORY  10/18/2016    Testicular Laparoscopy Right    OTHER SURGICAL HISTORY      LOOP RECORDER IMPLANT & REMOVAL   [3] No Known Allergies

## 2025-08-02 LAB
25(OH)D3+25(OH)D2 SERPL-MCNC: 57 NG/ML (ref 30–100)
ALBUMIN SERPL-MCNC: 4.3 G/DL (ref 3.6–5.1)
ALP SERPL-CCNC: 76 U/L (ref 35–144)
ALT SERPL-CCNC: 21 U/L (ref 9–46)
ANION GAP SERPL CALCULATED.4IONS-SCNC: 6 MMOL/L (CALC) (ref 7–17)
APPEARANCE UR: CLEAR
AST SERPL-CCNC: 20 U/L (ref 10–35)
BASOPHILS # BLD AUTO: 52 CELLS/UL (ref 0–200)
BASOPHILS NFR BLD AUTO: 0.9 %
BILIRUB SERPL-MCNC: 0.4 MG/DL (ref 0.2–1.2)
BILIRUB UR QL STRIP: NEGATIVE
BUN SERPL-MCNC: 15 MG/DL (ref 7–25)
CALCIUM SERPL-MCNC: 9.1 MG/DL (ref 8.6–10.3)
CHLORIDE SERPL-SCNC: 105 MMOL/L (ref 98–110)
CHOLEST SERPL-MCNC: 157 MG/DL
CHOLEST/HDLC SERPL: 3 (CALC)
CO2 SERPL-SCNC: 29 MMOL/L (ref 20–32)
COLOR UR: YELLOW
CREAT SERPL-MCNC: 0.89 MG/DL (ref 0.7–1.3)
CRP SERPL-MCNC: NORMAL MG/L
EGFRCR SERPLBLD CKD-EPI 2021: 101 ML/MIN/1.73M2
EOSINOPHIL # BLD AUTO: 70 CELLS/UL (ref 15–500)
EOSINOPHIL NFR BLD AUTO: 1.2 %
ERYTHROCYTE [DISTWIDTH] IN BLOOD BY AUTOMATED COUNT: 13 % (ref 11–15)
ERYTHROCYTE [SEDIMENTATION RATE] IN BLOOD BY WESTERGREN METHOD: 2 MM/H
GLUCOSE SERPL-MCNC: 100 MG/DL (ref 65–99)
GLUCOSE UR QL STRIP: NEGATIVE
HCT VFR BLD AUTO: 46.6 % (ref 38.5–50)
HDLC SERPL-MCNC: 52 MG/DL
HGB BLD-MCNC: 15.3 G/DL (ref 13.2–17.1)
HGB UR QL STRIP: NEGATIVE
KETONES UR QL STRIP: NEGATIVE
LDLC SERPL CALC-MCNC: 86 MG/DL (CALC)
LEUKOCYTE ESTERASE UR QL STRIP: NEGATIVE
LYMPHOCYTES # BLD AUTO: 1305 CELLS/UL (ref 850–3900)
LYMPHOCYTES NFR BLD AUTO: 22.5 %
MCH RBC QN AUTO: 29.9 PG (ref 27–33)
MCHC RBC AUTO-ENTMCNC: 32.8 G/DL (ref 32–36)
MCV RBC AUTO: 91.2 FL (ref 80–100)
MONOCYTES # BLD AUTO: 510 CELLS/UL (ref 200–950)
MONOCYTES NFR BLD AUTO: 8.8 %
NEUTROPHILS # BLD AUTO: 3863 CELLS/UL (ref 1500–7800)
NEUTROPHILS NFR BLD AUTO: 66.6 %
NITRITE UR QL STRIP: NEGATIVE
NONHDLC SERPL-MCNC: 105 MG/DL (CALC)
PH UR STRIP: 6.5 [PH] (ref 5–8)
PLATELET # BLD AUTO: 251 THOUSAND/UL (ref 140–400)
PMV BLD REES-ECKER: 9.9 FL (ref 7.5–12.5)
POTASSIUM SERPL-SCNC: 4.6 MMOL/L (ref 3.5–5.3)
PROT SERPL-MCNC: 6.6 G/DL (ref 6.1–8.1)
PROT UR QL STRIP: NEGATIVE
PSA SERPL-MCNC: 0.87 NG/ML
RBC # BLD AUTO: 5.11 MILLION/UL (ref 4.2–5.8)
SODIUM SERPL-SCNC: 140 MMOL/L (ref 135–146)
SP GR UR STRIP: 1.02 (ref 1–1.03)
T3FREE SERPL-MCNC: 3 PG/ML (ref 2.3–4.2)
TRIGL SERPL-MCNC: 97 MG/DL
TSH SERPL-ACNC: 0.95 MIU/L (ref 0.4–4.5)
WBC # BLD AUTO: 5.8 THOUSAND/UL (ref 3.8–10.8)

## 2025-08-04 LAB
CRP SERPL-MCNC: <3 MG/L
ERYTHROCYTE [SEDIMENTATION RATE] IN BLOOD BY WESTERGREN METHOD: 2 MM/H

## 2025-08-07 ENCOUNTER — HOSPITAL ENCOUNTER (OUTPATIENT)
Dept: RADIOLOGY | Facility: CLINIC | Age: 55
Discharge: HOME | End: 2025-08-07
Payer: COMMERCIAL

## 2025-08-07 ENCOUNTER — OFFICE VISIT (OUTPATIENT)
Dept: PRIMARY CARE | Facility: CLINIC | Age: 55
End: 2025-08-07
Payer: COMMERCIAL

## 2025-08-07 ENCOUNTER — APPOINTMENT (OUTPATIENT)
Dept: PRIMARY CARE | Facility: CLINIC | Age: 55
End: 2025-08-07
Payer: COMMERCIAL

## 2025-08-07 VITALS
WEIGHT: 185.4 LBS | TEMPERATURE: 97.3 F | DIASTOLIC BLOOD PRESSURE: 66 MMHG | OXYGEN SATURATION: 99 % | SYSTOLIC BLOOD PRESSURE: 104 MMHG | HEART RATE: 57 BPM | BODY MASS INDEX: 26.6 KG/M2

## 2025-08-07 DIAGNOSIS — S99.921A TOE INJURY, RIGHT, INITIAL ENCOUNTER: ICD-10-CM

## 2025-08-07 DIAGNOSIS — Z00.00 ADULT WELLNESS VISIT: Primary | ICD-10-CM

## 2025-08-07 PROCEDURE — 73660 X-RAY EXAM OF TOE(S): CPT | Mod: RIGHT SIDE | Performed by: RADIOLOGY

## 2025-08-07 PROCEDURE — 99396 PREV VISIT EST AGE 40-64: CPT | Performed by: INTERNAL MEDICINE

## 2025-08-07 PROCEDURE — 73660 X-RAY EXAM OF TOE(S): CPT | Mod: RT

## 2025-08-07 ASSESSMENT — COLUMBIA-SUICIDE SEVERITY RATING SCALE - C-SSRS
6. HAVE YOU EVER DONE ANYTHING, STARTED TO DO ANYTHING, OR PREPARED TO DO ANYTHING TO END YOUR LIFE?: NO
1. IN THE PAST MONTH, HAVE YOU WISHED YOU WERE DEAD OR WISHED YOU COULD GO TO SLEEP AND NOT WAKE UP?: NO
2. HAVE YOU ACTUALLY HAD ANY THOUGHTS OF KILLING YOURSELF?: NO

## 2025-08-07 NOTE — PROGRESS NOTES
CHIEF COMPLAINT:    Chief Complaint   Patient presents with    Follow-up     Patient is here today for a 6 month follow up    Toe Injury     Right pink toe, possibly broken         HISTORY OF PRESENT ILLNESS:  Erick Jean  is a pleasant 55 y.o. male who presents today for a 6 month follow up. He has no new medications and had no recent illnesses or hospitalizations. He is currently being evaluated but GI after his colonoscopy. He had to recently send in a fecal specimen for further testing and is awaiting results. He did give up vaping and denies THC use. On Tuesday (2 days ago) he hit his right 5th toe on his coffee table. He does not report pain and is able to move the toe.     He lives at home with his fiance. He is on a temporary lay off from [employer] but will be starting back in the next month.       TODAY'S VISIT  DX:     1. Adult wellness visit  Most recent labs, vitals, imaging reviewed.  Patient is up-to-date on preventative maintenance.  Discussed smoking cessation and limit excessive alcohol use.  Reports feeling safe at home and has access to at least 3 meals per day.      2. Toe injury, right, initial encounter  XR toe right 2+ views               Review of Systems   Constitutional:  No activity change or fever   HENT:  Denies ringing ears or nose bleed   Respiratory:  Denies stridor. No blood in sputum   Cardiovascular:  Denies chest pain, no sudden excessive sweating   Gastrointestinal:  No sour burping, no blood in stool    Genitourinary:  Denies blood in urine    Musculoskeletal:  No joint redness or swelling    Skin:  No new spot changing color or shape or border    Neurological:  No speech difficulty, facial droop    Psychiatric/Behavioral:  No agitation, denies Hallucination      Visit Vitals  /66 (BP Location: Left arm, Patient Position: Sitting, BP Cuff Size: Adult)   Pulse 57   Temp 36.3 °C (97.3 °F) (Temporal)   Wt 84.1 kg (185 lb 6.4 oz)   SpO2 99% Comment: RA   BMI 26.60 kg/m²   Smoking  Status Former   BSA 2.04 m²         Wt Readings from Last 10 Encounters:   08/07/25 84.1 kg (185 lb 6.4 oz)   03/26/25 77.5 kg (170 lb 13.7 oz)   03/12/25 83.9 kg (185 lb)   02/06/25 83.7 kg (184 lb 9.6 oz)   07/22/24 87.1 kg (192 lb)   07/09/24 86.4 kg (190 lb 6.4 oz)   02/12/24 89.4 kg (197 lb)   12/20/23 88.5 kg (195 lb)   10/12/23 87.1 kg (192 lb)   10/11/23 87.1 kg (192 lb)       Physical Exam  Constitutional:       Appearance: He is normal weight.     Cardiovascular:      Rate and Rhythm: Normal rate and regular rhythm.   Abdominal:      General: Abdomen is flat. There is no distension.      Palpations: Abdomen is soft.      Tenderness: There is no abdominal tenderness.     Musculoskeletal:      Comments: Bruising on dorsal side of right foot and right 5th toe. ROM intact.     Neurological:      General: No focal deficit present.      Mental Status: He is alert. Mental status is at baseline.     Psychiatric:         Mood and Affect: Mood normal.         Behavior: Behavior normal.         Thought Content: Thought content normal.         Judgment: Judgment normal.          RECENT LABS:  Lab Results   Component Value Date    WBC 5.8 08/01/2025    HGB 15.3 08/01/2025    HCT 46.6 08/01/2025     08/01/2025    CHOL 157 08/01/2025    TRIG 97 08/01/2025    HDL 52 08/01/2025    ALT 21 08/01/2025    AST 20 08/01/2025     08/01/2025    K 4.6 08/01/2025     08/01/2025    CREATININE 0.89 08/01/2025    BUN 15 08/01/2025    CO2 29 08/01/2025    TSH 0.95 08/01/2025    PSA 0.87 08/01/2025    INR 1.0 09/27/2023     Lab Results   Component Value Date    GLUCOSE 100 (H) 08/01/2025    CALCIUM 9.1 08/01/2025     08/01/2025    K 4.6 08/01/2025    CO2 29 08/01/2025     08/01/2025    BUN 15 08/01/2025    CREATININE 0.89 08/01/2025        IMAGING:  === 09/29/23 ===  CHEST 2 VIEW  1.  No evidence of acute cardiopulmonary process.     === 08/12/22 ===  CT HEART CALCIUM SCORING WO IV CONTRAST    - Impression  -  1. Coronary artery calcium score of  0*.        MEDICATIONS:   Current Outpatient Medications   Medication Instructions    atorvastatin (LIPITOR) 10 mg, oral, Daily    cholecalciferol, vitamin D3, (VITAMIN D3 ORAL) 1 tablet, Every Day    famotidine (Pepcid) 40 mg tablet TAKE 1 TABLET BY MOUTH EVERYDAY AT BEDTIME    levothyroxine (SYNTHROID, LEVOXYL) 100 mcg, oral, Daily          MEDICAL DECISION MAKING:  - The current and active medical co morbidities have been considered.   - Recent lab work and relevant imaging studies have been reviewed.    - Relevant correspondence/notes from other specialty consultants were reviewed.    - Medication have been sent for refill.    - Next Follow up in 6 months or unless clinically indicated.  - Patient was given treatment as per above plan        P.S: This note was completed using Dragon voice recognition technology and may include unintended errors with respect to translation of words, typography or grammatical errors. They may not have been identified while finalizing the chart.

## 2025-08-08 ENCOUNTER — RESULTS FOLLOW-UP (OUTPATIENT)
Dept: GASTROENTEROLOGY | Facility: CLINIC | Age: 55
End: 2025-08-08
Payer: COMMERCIAL

## 2025-08-08 LAB — CALPROTECTIN STL-MCNT: 141 MCG/G

## 2025-08-08 NOTE — TELEPHONE ENCOUNTER
----- Message from Garrett Hernandez sent at 8/8/2025 11:15 AM EDT -----  Please let patient know that his stool test was only minimally elevated. Follow up appointment in 6 months  ----- Message -----  From: Selene Underwood Results In  Sent: 8/2/2025   9:43 AM EDT  To: Garrett Hernandez, DO